# Patient Record
Sex: FEMALE | Race: WHITE | NOT HISPANIC OR LATINO | Employment: FULL TIME | URBAN - METROPOLITAN AREA
[De-identification: names, ages, dates, MRNs, and addresses within clinical notes are randomized per-mention and may not be internally consistent; named-entity substitution may affect disease eponyms.]

---

## 2019-02-01 ENCOUNTER — HOSPITAL ENCOUNTER (OUTPATIENT)
Dept: FAMILY MEDICINE CLINIC | Facility: CLINIC | Age: 64
Setting detail: SPECIMEN
Discharge: HOME | End: 2019-02-01
Attending: INTERNAL MEDICINE | Admitting: INTERNAL MEDICINE

## 2019-02-01 LAB
ALBUMIN SERPL-MCNC: 3.9 G/DL (ref 3.5–4.8)
ALBUMIN/GLOB SERPL: 1.7 {RATIO} (ref 1–1.7)
ALP SERPL-CCNC: 66 IU/L (ref 32–91)
ALT SERPL-CCNC: 14 IU/L (ref 14–54)
ANION GAP SERPL CALC-SCNC: 15.4 MMOL/L (ref 10–20)
AST SERPL-CCNC: 18 IU/L (ref 15–41)
BASOPHILS # BLD AUTO: 0.1 10*3/UL (ref 0–0.2)
BASOPHILS NFR BLD AUTO: 2 % (ref 0–2)
BILIRUB SERPL-MCNC: 0.8 MG/DL (ref 0.3–1.2)
BUN SERPL-MCNC: 14 MG/DL (ref 8–20)
BUN/CREAT SERPL: 17.5 (ref 5.4–26.2)
CALCIUM SERPL-MCNC: 9.1 MG/DL (ref 8.9–10.3)
CHLORIDE SERPL-SCNC: 100 MMOL/L (ref 101–111)
CHOLEST SERPL-MCNC: 214 MG/DL
CHOLEST/HDLC SERPL: 4.1 {RATIO}
CONV CO2: 25 MMOL/L (ref 22–32)
CONV LDL CHOLESTEROL DIRECT: 149 MG/DL (ref 0–100)
CONV TOTAL PROTEIN: 6.2 G/DL (ref 6.1–7.9)
CREAT UR-MCNC: 0.8 MG/DL (ref 0.4–1)
DIFFERENTIAL METHOD BLD: (no result)
EOSINOPHIL # BLD AUTO: 0.1 10*3/UL (ref 0–0.3)
EOSINOPHIL # BLD AUTO: 2 % (ref 0–3)
ERYTHROCYTE [DISTWIDTH] IN BLOOD BY AUTOMATED COUNT: 14.2 % (ref 11.5–14.5)
GLOBULIN UR ELPH-MCNC: 2.3 G/DL (ref 2.5–3.8)
GLUCOSE SERPL-MCNC: 133 MG/DL (ref 65–99)
HCT VFR BLD AUTO: 42.6 % (ref 35–49)
HDLC SERPL-MCNC: 52 MG/DL
HGB BLD-MCNC: 14 G/DL (ref 12–15)
LDLC/HDLC SERPL: 2.9 {RATIO}
LIPID INTERPRETATION: ABNORMAL
LYMPHOCYTES # BLD AUTO: 1.4 10*3/UL (ref 0.8–4.8)
LYMPHOCYTES NFR BLD AUTO: 30 % (ref 18–42)
MCH RBC QN AUTO: 29.2 PG (ref 26–32)
MCHC RBC AUTO-ENTMCNC: 32.9 G/DL (ref 32–36)
MCV RBC AUTO: 88.7 FL (ref 80–94)
MONOCYTES # BLD AUTO: 0.4 10*3/UL (ref 0.1–1.3)
MONOCYTES NFR BLD AUTO: 9 % (ref 2–11)
NEUTROPHILS # BLD AUTO: 2.6 10*3/UL (ref 2.3–8.6)
NEUTROPHILS NFR BLD AUTO: 57 % (ref 50–75)
NRBC BLD AUTO-RTO: 0 /100{WBCS}
NRBC/RBC NFR BLD MANUAL: 0 10*3/UL
PLATELET # BLD AUTO: 230 10*3/UL (ref 150–450)
PMV BLD AUTO: 9 FL (ref 7.4–10.4)
POTASSIUM SERPL-SCNC: 4.4 MMOL/L (ref 3.6–5.1)
RBC # BLD AUTO: 4.8 10*6/UL (ref 4–5.4)
SODIUM SERPL-SCNC: 136 MMOL/L (ref 136–144)
TRIGL SERPL-MCNC: 112 MG/DL
VLDLC SERPL CALC-MCNC: 12.9 MG/DL
WBC # BLD AUTO: 4.6 10*3/UL (ref 4.5–11.5)

## 2019-05-31 ENCOUNTER — HOSPITAL ENCOUNTER (OUTPATIENT)
Dept: FAMILY MEDICINE CLINIC | Facility: CLINIC | Age: 64
Setting detail: SPECIMEN
Discharge: HOME | End: 2019-05-31
Attending: INTERNAL MEDICINE | Admitting: INTERNAL MEDICINE

## 2019-05-31 LAB
25(OH)D3 SERPL-MCNC: 21 NG/ML (ref 30–100)
ALBUMIN SERPL-MCNC: 3.8 G/DL (ref 3.5–4.8)
ALBUMIN/GLOB SERPL: 1.7 {RATIO} (ref 1–1.7)
ALP SERPL-CCNC: 53 IU/L (ref 32–91)
ALT SERPL-CCNC: 13 IU/L (ref 14–54)
ANION GAP SERPL CALC-SCNC: 18.2 MMOL/L (ref 10–20)
AST SERPL-CCNC: 17 IU/L (ref 15–41)
BILIRUB SERPL-MCNC: 0.8 MG/DL (ref 0.3–1.2)
BUN SERPL-MCNC: 14 MG/DL (ref 8–20)
BUN/CREAT SERPL: 17.5 (ref 5.4–26.2)
CALCIUM SERPL-MCNC: 9.1 MG/DL (ref 8.9–10.3)
CHLORIDE SERPL-SCNC: 104 MMOL/L (ref 101–111)
CHOLEST SERPL-MCNC: 201 MG/DL
CHOLEST/HDLC SERPL: 4.2 {RATIO}
CONV CO2: 23 MMOL/L (ref 22–32)
CONV LDL CHOLESTEROL DIRECT: 139 MG/DL (ref 0–100)
CONV TOTAL PROTEIN: 6.1 G/DL (ref 6.1–7.9)
CREAT UR-MCNC: 0.8 MG/DL (ref 0.4–1)
GLOBULIN UR ELPH-MCNC: 2.3 G/DL (ref 2.5–3.8)
GLUCOSE SERPL-MCNC: 132 MG/DL (ref 65–99)
HBA1C MFR BLD: 6 % (ref 0–5.6)
HDLC SERPL-MCNC: 47 MG/DL
LDLC/HDLC SERPL: 2.9 {RATIO}
LIPID INTERPRETATION: ABNORMAL
POTASSIUM SERPL-SCNC: 4.2 MMOL/L (ref 3.6–5.1)
SODIUM SERPL-SCNC: 141 MMOL/L (ref 136–144)
TRIGL SERPL-MCNC: 131 MG/DL
VLDLC SERPL CALC-MCNC: 14.6 MG/DL

## 2019-08-16 RX ORDER — LISINOPRIL 2.5 MG/1
TABLET ORAL
Qty: 90 TABLET | Refills: 2 | Status: SHIPPED | OUTPATIENT
Start: 2019-08-16 | End: 2020-05-18

## 2019-11-18 RX ORDER — METFORMIN HYDROCHLORIDE 500 MG/1
TABLET, EXTENDED RELEASE ORAL
Qty: 90 TABLET | Refills: 1 | Status: SHIPPED | OUTPATIENT
Start: 2019-11-18 | End: 2020-07-15

## 2019-11-18 RX ORDER — ESCITALOPRAM OXALATE 20 MG/1
TABLET ORAL
Qty: 90 TABLET | Refills: 1 | Status: SHIPPED | OUTPATIENT
Start: 2019-11-18 | End: 2020-10-22

## 2019-12-19 ENCOUNTER — TELEPHONE (OUTPATIENT)
Dept: FAMILY MEDICINE CLINIC | Facility: CLINIC | Age: 64
End: 2019-12-19

## 2019-12-19 DIAGNOSIS — Z12.39 SCREENING FOR BREAST CANCER: Primary | ICD-10-CM

## 2019-12-19 NOTE — TELEPHONE ENCOUNTER
Cherelle, Can you please let patient know that I am faxing it over in the 5 mins and give them about 30 to 45 mins to get it in their system and then she should be able to call and schedule.      Thanks, Bambi

## 2019-12-19 NOTE — TELEPHONE ENCOUNTER
Needs an order for a mammogram faxed to [facility] at fax [phone].

## 2019-12-24 ENCOUNTER — LAB (OUTPATIENT)
Dept: FAMILY MEDICINE CLINIC | Facility: CLINIC | Age: 64
End: 2019-12-24

## 2019-12-24 ENCOUNTER — OFFICE VISIT (OUTPATIENT)
Dept: FAMILY MEDICINE CLINIC | Facility: CLINIC | Age: 64
End: 2019-12-24

## 2019-12-24 VITALS
TEMPERATURE: 98.2 F | WEIGHT: 239.4 LBS | RESPIRATION RATE: 12 BRPM | OXYGEN SATURATION: 97 % | HEIGHT: 68 IN | HEART RATE: 53 BPM | BODY MASS INDEX: 36.28 KG/M2 | DIASTOLIC BLOOD PRESSURE: 70 MMHG | SYSTOLIC BLOOD PRESSURE: 122 MMHG

## 2019-12-24 DIAGNOSIS — I10 ESSENTIAL HYPERTENSION: ICD-10-CM

## 2019-12-24 DIAGNOSIS — F41.9 ANXIETY: ICD-10-CM

## 2019-12-24 DIAGNOSIS — E11.65 TYPE 2 DIABETES MELLITUS WITH HYPERGLYCEMIA, WITHOUT LONG-TERM CURRENT USE OF INSULIN (HCC): ICD-10-CM

## 2019-12-24 DIAGNOSIS — E78.5 HYPERLIPIDEMIA, UNSPECIFIED HYPERLIPIDEMIA TYPE: ICD-10-CM

## 2019-12-24 DIAGNOSIS — E11.65 TYPE 2 DIABETES MELLITUS WITH HYPERGLYCEMIA, WITHOUT LONG-TERM CURRENT USE OF INSULIN (HCC): Primary | ICD-10-CM

## 2019-12-24 LAB
ALBUMIN SERPL-MCNC: 4.1 G/DL (ref 3.5–5.2)
ALBUMIN/GLOB SERPL: 1.4 G/DL
ALP SERPL-CCNC: 69 U/L (ref 39–117)
ALT SERPL W P-5'-P-CCNC: 12 U/L (ref 1–33)
ANION GAP SERPL CALCULATED.3IONS-SCNC: 12.2 MMOL/L (ref 5–15)
AST SERPL-CCNC: 14 U/L (ref 1–32)
BASOPHILS # BLD AUTO: 0.04 10*3/MM3 (ref 0–0.2)
BASOPHILS NFR BLD AUTO: 0.8 % (ref 0–1.5)
BILIRUB SERPL-MCNC: 0.5 MG/DL (ref 0.2–1.2)
BUN BLD-MCNC: 15 MG/DL (ref 8–23)
BUN/CREAT SERPL: 20.8 (ref 7–25)
CALCIUM SPEC-SCNC: 9.6 MG/DL (ref 8.6–10.5)
CHLORIDE SERPL-SCNC: 102 MMOL/L (ref 98–107)
CHOLEST SERPL-MCNC: 213 MG/DL (ref 0–200)
CO2 SERPL-SCNC: 25.8 MMOL/L (ref 22–29)
CREAT BLD-MCNC: 0.72 MG/DL (ref 0.57–1)
DEPRECATED RDW RBC AUTO: 42 FL (ref 37–54)
EOSINOPHIL # BLD AUTO: 0.08 10*3/MM3 (ref 0–0.4)
EOSINOPHIL NFR BLD AUTO: 1.6 % (ref 0.3–6.2)
ERYTHROCYTE [DISTWIDTH] IN BLOOD BY AUTOMATED COUNT: 13.1 % (ref 12.3–15.4)
GFR SERPL CREATININE-BSD FRML MDRD: 82 ML/MIN/1.73
GLOBULIN UR ELPH-MCNC: 2.9 GM/DL
GLUCOSE BLD-MCNC: 126 MG/DL (ref 65–99)
HBA1C MFR BLD: 6.2 % (ref 3.5–5.6)
HCT VFR BLD AUTO: 40.3 % (ref 34–46.6)
HDLC SERPL-MCNC: 60 MG/DL (ref 40–60)
HGB BLD-MCNC: 13.5 G/DL (ref 12–15.9)
IMM GRANULOCYTES # BLD AUTO: 0.01 10*3/MM3 (ref 0–0.05)
IMM GRANULOCYTES NFR BLD AUTO: 0.2 % (ref 0–0.5)
LDLC SERPL CALC-MCNC: 132 MG/DL (ref 0–100)
LDLC/HDLC SERPL: 2.2 {RATIO}
LYMPHOCYTES # BLD AUTO: 1.75 10*3/MM3 (ref 0.7–3.1)
LYMPHOCYTES NFR BLD AUTO: 34.7 % (ref 19.6–45.3)
MCH RBC QN AUTO: 29.4 PG (ref 26.6–33)
MCHC RBC AUTO-ENTMCNC: 33.5 G/DL (ref 31.5–35.7)
MCV RBC AUTO: 87.8 FL (ref 79–97)
MONOCYTES # BLD AUTO: 0.41 10*3/MM3 (ref 0.1–0.9)
MONOCYTES NFR BLD AUTO: 8.1 % (ref 5–12)
NEUTROPHILS # BLD AUTO: 2.75 10*3/MM3 (ref 1.7–7)
NEUTROPHILS NFR BLD AUTO: 54.6 % (ref 42.7–76)
NRBC BLD AUTO-RTO: 0 /100 WBC (ref 0–0.2)
PLATELET # BLD AUTO: 222 10*3/MM3 (ref 140–450)
PMV BLD AUTO: 11.7 FL (ref 6–12)
POTASSIUM BLD-SCNC: 4.7 MMOL/L (ref 3.5–5.2)
PROT SERPL-MCNC: 7 G/DL (ref 6–8.5)
RBC # BLD AUTO: 4.59 10*6/MM3 (ref 3.77–5.28)
SODIUM BLD-SCNC: 140 MMOL/L (ref 136–145)
TRIGL SERPL-MCNC: 105 MG/DL (ref 0–150)
TSH SERPL DL<=0.05 MIU/L-ACNC: 2.24 UIU/ML (ref 0.27–4.2)
VLDLC SERPL-MCNC: 21 MG/DL (ref 5–40)
WBC NRBC COR # BLD: 5.04 10*3/MM3 (ref 3.4–10.8)

## 2019-12-24 PROCEDURE — 80053 COMPREHEN METABOLIC PANEL: CPT | Performed by: INTERNAL MEDICINE

## 2019-12-24 PROCEDURE — 83036 HEMOGLOBIN GLYCOSYLATED A1C: CPT | Performed by: INTERNAL MEDICINE

## 2019-12-24 PROCEDURE — 84443 ASSAY THYROID STIM HORMONE: CPT | Performed by: INTERNAL MEDICINE

## 2019-12-24 PROCEDURE — 99214 OFFICE O/P EST MOD 30 MIN: CPT | Performed by: INTERNAL MEDICINE

## 2019-12-24 PROCEDURE — 36415 COLL VENOUS BLD VENIPUNCTURE: CPT

## 2019-12-24 PROCEDURE — 85025 COMPLETE CBC W/AUTO DIFF WBC: CPT | Performed by: INTERNAL MEDICINE

## 2019-12-24 PROCEDURE — 80061 LIPID PANEL: CPT | Performed by: INTERNAL MEDICINE

## 2019-12-24 RX ORDER — DIAZEPAM 2 MG/1
2-4 TABLET ORAL DAILY PRN
Qty: 20 TABLET | Refills: 1 | Status: SHIPPED | OUTPATIENT
Start: 2019-12-24 | End: 2020-04-16

## 2019-12-24 RX ORDER — SODIUM FLUORIDE 5 MG/ML
PASTE, DENTIFRICE DENTAL
COMMUNITY
Start: 2018-12-10 | End: 2020-11-11

## 2019-12-24 RX ORDER — PRAVASTATIN SODIUM 40 MG
40 TABLET ORAL DAILY
COMMUNITY
Start: 2018-10-23 | End: 2020-01-29 | Stop reason: SDUPTHER

## 2019-12-24 RX ORDER — METFORMIN HYDROCHLORIDE 500 MG/1
TABLET, EXTENDED RELEASE ORAL
COMMUNITY
Start: 2019-11-18 | End: 2020-11-11

## 2019-12-24 RX ORDER — LISINOPRIL 2.5 MG/1
TABLET ORAL
COMMUNITY
Start: 2019-11-19 | End: 2020-11-11

## 2019-12-24 RX ORDER — ESCITALOPRAM OXALATE 20 MG/1
TABLET ORAL
COMMUNITY
Start: 2019-11-18 | End: 2020-11-11

## 2020-01-10 DIAGNOSIS — Z12.31 ENCOUNTER FOR SCREENING MAMMOGRAM FOR BREAST CANCER: Primary | ICD-10-CM

## 2020-01-13 DIAGNOSIS — Z12.31 ENCOUNTER FOR SCREENING MAMMOGRAM FOR BREAST CANCER: ICD-10-CM

## 2020-01-23 ENCOUNTER — TELEPHONE (OUTPATIENT)
Dept: FAMILY MEDICINE CLINIC | Facility: CLINIC | Age: 65
End: 2020-01-23

## 2020-01-23 RX ORDER — FLUCONAZOLE 150 MG/1
TABLET ORAL
Qty: 1 TABLET | Refills: 0 | Status: SHIPPED | OUTPATIENT
Start: 2020-01-23 | End: 2020-11-11

## 2020-01-29 RX ORDER — PRAVASTATIN SODIUM 40 MG
40 TABLET ORAL DAILY
Qty: 90 TABLET | Refills: 0 | Status: SHIPPED | OUTPATIENT
Start: 2020-01-29 | End: 2020-04-21

## 2020-04-15 DIAGNOSIS — F41.9 ANXIETY: ICD-10-CM

## 2020-04-16 RX ORDER — DIAZEPAM 2 MG/1
2-4 TABLET ORAL DAILY PRN
Qty: 20 TABLET | Refills: 1 | Status: SHIPPED | OUTPATIENT
Start: 2020-04-16 | End: 2020-11-11 | Stop reason: SDUPTHER

## 2020-04-21 RX ORDER — PRAVASTATIN SODIUM 40 MG
TABLET ORAL
Qty: 90 TABLET | Refills: 0 | Status: SHIPPED | OUTPATIENT
Start: 2020-04-21 | End: 2020-10-16

## 2020-05-18 RX ORDER — LISINOPRIL 2.5 MG/1
TABLET ORAL
Qty: 90 TABLET | Refills: 2 | Status: SHIPPED | OUTPATIENT
Start: 2020-05-18 | End: 2021-06-04 | Stop reason: SDUPTHER

## 2020-07-15 RX ORDER — METFORMIN HYDROCHLORIDE 500 MG/1
TABLET, EXTENDED RELEASE ORAL
Qty: 90 TABLET | Refills: 1 | Status: SHIPPED | OUTPATIENT
Start: 2020-07-15 | End: 2021-01-11

## 2020-10-16 RX ORDER — PRAVASTATIN SODIUM 40 MG
TABLET ORAL
Qty: 90 TABLET | Refills: 0 | Status: SHIPPED | OUTPATIENT
Start: 2020-10-16 | End: 2021-01-11

## 2020-10-22 RX ORDER — ESCITALOPRAM OXALATE 20 MG/1
TABLET ORAL
Qty: 90 TABLET | Refills: 1 | Status: SHIPPED | OUTPATIENT
Start: 2020-10-22 | End: 2021-05-25

## 2020-11-11 ENCOUNTER — OFFICE VISIT (OUTPATIENT)
Dept: FAMILY MEDICINE CLINIC | Facility: CLINIC | Age: 65
End: 2020-11-11

## 2020-11-11 ENCOUNTER — LAB (OUTPATIENT)
Dept: FAMILY MEDICINE CLINIC | Facility: CLINIC | Age: 65
End: 2020-11-11

## 2020-11-11 VITALS
SYSTOLIC BLOOD PRESSURE: 126 MMHG | BODY MASS INDEX: 38.13 KG/M2 | RESPIRATION RATE: 16 BRPM | HEART RATE: 53 BPM | TEMPERATURE: 97.3 F | OXYGEN SATURATION: 97 % | HEIGHT: 68 IN | WEIGHT: 251.6 LBS | DIASTOLIC BLOOD PRESSURE: 72 MMHG

## 2020-11-11 DIAGNOSIS — E78.5 HYPERLIPIDEMIA, UNSPECIFIED HYPERLIPIDEMIA TYPE: ICD-10-CM

## 2020-11-11 DIAGNOSIS — E11.65 TYPE 2 DIABETES MELLITUS WITH HYPERGLYCEMIA, WITHOUT LONG-TERM CURRENT USE OF INSULIN (HCC): ICD-10-CM

## 2020-11-11 DIAGNOSIS — I10 ESSENTIAL HYPERTENSION: ICD-10-CM

## 2020-11-11 DIAGNOSIS — E55.9 VITAMIN D DEFICIENCY: ICD-10-CM

## 2020-11-11 DIAGNOSIS — F41.9 ANXIETY: ICD-10-CM

## 2020-11-11 DIAGNOSIS — I10 ESSENTIAL HYPERTENSION: Primary | ICD-10-CM

## 2020-11-11 LAB
25(OH)D3 SERPL-MCNC: 50.9 NG/ML (ref 30–100)
ALBUMIN SERPL-MCNC: 4.3 G/DL (ref 3.5–5.2)
ALBUMIN UR-MCNC: <1.2 MG/DL
ALBUMIN/GLOB SERPL: 1.9 G/DL
ALP SERPL-CCNC: 65 U/L (ref 39–117)
ALT SERPL W P-5'-P-CCNC: 17 U/L (ref 1–33)
ANION GAP SERPL CALCULATED.3IONS-SCNC: 6.6 MMOL/L (ref 5–15)
AST SERPL-CCNC: 21 U/L (ref 1–32)
BACTERIA UR QL AUTO: NORMAL /HPF
BASOPHILS # BLD AUTO: 0.03 10*3/MM3 (ref 0–0.2)
BASOPHILS NFR BLD AUTO: 0.7 % (ref 0–1.5)
BILIRUB SERPL-MCNC: 0.5 MG/DL (ref 0–1.2)
BILIRUB UR QL STRIP: NEGATIVE
BUN SERPL-MCNC: 11 MG/DL (ref 8–23)
BUN/CREAT SERPL: 14.7 (ref 7–25)
CALCIUM SPEC-SCNC: 8.9 MG/DL (ref 8.6–10.5)
CHLORIDE SERPL-SCNC: 104 MMOL/L (ref 98–107)
CHOLEST SERPL-MCNC: 199 MG/DL (ref 0–200)
CLARITY UR: CLEAR
CO2 SERPL-SCNC: 28.4 MMOL/L (ref 22–29)
COLOR UR: YELLOW
CREAT SERPL-MCNC: 0.75 MG/DL (ref 0.57–1)
CREAT UR-MCNC: 88.1 MG/DL
DEPRECATED RDW RBC AUTO: 42.9 FL (ref 37–54)
EOSINOPHIL # BLD AUTO: 0.08 10*3/MM3 (ref 0–0.4)
EOSINOPHIL NFR BLD AUTO: 1.8 % (ref 0.3–6.2)
ERYTHROCYTE [DISTWIDTH] IN BLOOD BY AUTOMATED COUNT: 13.1 % (ref 12.3–15.4)
GFR SERPL CREATININE-BSD FRML MDRD: 78 ML/MIN/1.73
GLOBULIN UR ELPH-MCNC: 2.3 GM/DL
GLUCOSE SERPL-MCNC: 112 MG/DL (ref 65–99)
GLUCOSE UR STRIP-MCNC: NEGATIVE MG/DL
HBA1C MFR BLD: 6.7 % (ref 3.5–5.6)
HCT VFR BLD AUTO: 38.9 % (ref 34–46.6)
HDLC SERPL-MCNC: 55 MG/DL (ref 40–60)
HGB BLD-MCNC: 13.3 G/DL (ref 12–15.9)
HGB UR QL STRIP.AUTO: NEGATIVE
HYALINE CASTS UR QL AUTO: NORMAL /LPF
IMM GRANULOCYTES # BLD AUTO: 0.01 10*3/MM3 (ref 0–0.05)
IMM GRANULOCYTES NFR BLD AUTO: 0.2 % (ref 0–0.5)
KETONES UR QL STRIP: NEGATIVE
LDLC SERPL CALC-MCNC: 124 MG/DL (ref 0–100)
LDLC/HDLC SERPL: 2.21 {RATIO}
LEUKOCYTE ESTERASE UR QL STRIP.AUTO: ABNORMAL
LYMPHOCYTES # BLD AUTO: 1.58 10*3/MM3 (ref 0.7–3.1)
LYMPHOCYTES NFR BLD AUTO: 35.2 % (ref 19.6–45.3)
MCH RBC QN AUTO: 30.6 PG (ref 26.6–33)
MCHC RBC AUTO-ENTMCNC: 34.2 G/DL (ref 31.5–35.7)
MCV RBC AUTO: 89.4 FL (ref 79–97)
MICROALBUMIN/CREAT UR: NORMAL MG/G{CREAT}
MONOCYTES # BLD AUTO: 0.4 10*3/MM3 (ref 0.1–0.9)
MONOCYTES NFR BLD AUTO: 8.9 % (ref 5–12)
NEUTROPHILS NFR BLD AUTO: 2.39 10*3/MM3 (ref 1.7–7)
NEUTROPHILS NFR BLD AUTO: 53.2 % (ref 42.7–76)
NITRITE UR QL STRIP: NEGATIVE
NRBC BLD AUTO-RTO: 0 /100 WBC (ref 0–0.2)
PH UR STRIP.AUTO: 5.5 [PH] (ref 5–8)
PLATELET # BLD AUTO: 205 10*3/MM3 (ref 140–450)
PMV BLD AUTO: 11 FL (ref 6–12)
POTASSIUM SERPL-SCNC: 4.4 MMOL/L (ref 3.5–5.2)
PROT SERPL-MCNC: 6.6 G/DL (ref 6–8.5)
PROT UR QL STRIP: NEGATIVE
RBC # BLD AUTO: 4.35 10*6/MM3 (ref 3.77–5.28)
RBC # UR: NORMAL /HPF
REF LAB TEST METHOD: NORMAL
SODIUM SERPL-SCNC: 139 MMOL/L (ref 136–145)
SP GR UR STRIP: 1.02 (ref 1–1.03)
SQUAMOUS #/AREA URNS HPF: NORMAL /HPF
TRIGL SERPL-MCNC: 112 MG/DL (ref 0–150)
TSH SERPL DL<=0.05 MIU/L-ACNC: 2.51 UIU/ML (ref 0.27–4.2)
UROBILINOGEN UR QL STRIP: ABNORMAL
VLDLC SERPL-MCNC: 20 MG/DL (ref 5–40)
WBC # BLD AUTO: 4.49 10*3/MM3 (ref 3.4–10.8)
WBC UR QL AUTO: NORMAL /HPF

## 2020-11-11 PROCEDURE — 82043 UR ALBUMIN QUANTITATIVE: CPT | Performed by: INTERNAL MEDICINE

## 2020-11-11 PROCEDURE — 80053 COMPREHEN METABOLIC PANEL: CPT | Performed by: INTERNAL MEDICINE

## 2020-11-11 PROCEDURE — 99214 OFFICE O/P EST MOD 30 MIN: CPT | Performed by: INTERNAL MEDICINE

## 2020-11-11 PROCEDURE — 83036 HEMOGLOBIN GLYCOSYLATED A1C: CPT | Performed by: INTERNAL MEDICINE

## 2020-11-11 PROCEDURE — 84443 ASSAY THYROID STIM HORMONE: CPT | Performed by: INTERNAL MEDICINE

## 2020-11-11 PROCEDURE — 85025 COMPLETE CBC W/AUTO DIFF WBC: CPT | Performed by: INTERNAL MEDICINE

## 2020-11-11 PROCEDURE — 36415 COLL VENOUS BLD VENIPUNCTURE: CPT

## 2020-11-11 PROCEDURE — 82570 ASSAY OF URINE CREATININE: CPT | Performed by: INTERNAL MEDICINE

## 2020-11-11 PROCEDURE — 82306 VITAMIN D 25 HYDROXY: CPT | Performed by: INTERNAL MEDICINE

## 2020-11-11 PROCEDURE — 81001 URINALYSIS AUTO W/SCOPE: CPT

## 2020-11-11 PROCEDURE — 80061 LIPID PANEL: CPT | Performed by: INTERNAL MEDICINE

## 2020-11-11 RX ORDER — DIAZEPAM 2 MG/1
2-4 TABLET ORAL DAILY PRN
Qty: 20 TABLET | Refills: 1 | Status: SHIPPED | OUTPATIENT
Start: 2020-11-11 | End: 2021-06-04 | Stop reason: SDUPTHER

## 2020-11-11 NOTE — PROGRESS NOTES
Subjective   Selene Holley is a 65 y.o. female.     Pt is here for med check dm, hpld, mood d/o  She is due for labs  Her  is now [place] in the [place], and sober  And she has realized that he had been using alcohol  As a way to self-treat his mood d/o  bc of covid, she hasn't been eating as well or exercising    No chest pain, SOA  No abd pain, n/v/d  No fever, cough  No dizziness  No headache       The following portions of the patient's history were reviewed and updated as appropriate: allergies, current medications, past family history, past medical history, past social history, past surgical history and problem list.    Review of Systems   Constitutional: Negative for fatigue and fever.   HENT: Negative for congestion, ear pain, rhinorrhea and sore throat.    Eyes: Negative for blurred vision and itching.   Respiratory: Negative for cough and shortness of breath.    Cardiovascular: Negative for chest pain and palpitations.   Gastrointestinal: Negative for abdominal pain, diarrhea and vomiting.   Endocrine: Negative for polydipsia and polyuria.   Genitourinary: Negative for dysuria, frequency, hematuria and urgency.   Musculoskeletal: Negative for joint swelling and myalgias.   Skin: Negative for rash and skin lesions.   Neurological: Negative for dizziness, numbness and headache.   Psychiatric/Behavioral: Positive for stress. Negative for depressed mood. The patient is not nervous/anxious.          Current Outpatient Medications:   •  diazePAM (VALIUM) 2 MG tablet, TAKE 1-2 TABLETS BY MOUTH DAILY AS NEEDED FOR ANXIETY., Disp: 20 tablet, Rfl: 1  •  escitalopram (LEXAPRO) 20 MG tablet, TAKE 1 TABLET BY MOUTH EVERY DAY, Disp: 90 tablet, Rfl: 1  •  lisinopril (PRINIVIL,ZESTRIL) 2.5 MG tablet, TAKE 1 TABLET BY MOUTH EVERY DAY, Disp: 90 tablet, Rfl: 2  •  metFORMIN ER (GLUCOPHAGE-XR) 500 MG 24 hr tablet, TAKE 1 TABLET BY MOUTH EVERY DAY, Disp: 90 tablet, Rfl: 1  •  pravastatin (PRAVACHOL) 40 MG tablet, TAKE 1 TABLET  "BY MOUTH EVERY DAY, Disp: 90 tablet, Rfl: 0    Objective   /72 (BP Location: Left arm, Patient Position: Sitting, Cuff Size: Adult)   Pulse 53   Temp 97.3 °F (36.3 °C) (Temporal)   Resp 16   Ht 172.7 cm (68\")   Wt 114 kg (251 lb 9.6 oz)   SpO2 97%   Breastfeeding No   BMI 38.26 kg/m²   Physical Exam  Vitals signs reviewed.   Constitutional:       General: She is not in acute distress.     Appearance: She is well-developed. She is not diaphoretic.   HENT:      Head: Normocephalic and atraumatic.      Right Ear: Tympanic membrane, ear canal and external ear normal.      Left Ear: Tympanic membrane, ear canal and external ear normal.      Mouth/Throat:      Pharynx: No oropharyngeal exudate.   Eyes:      General: No scleral icterus.        Right eye: No discharge.         Left eye: No discharge.      Conjunctiva/sclera: Conjunctivae normal.   Neck:      Musculoskeletal: Normal range of motion and neck supple.      Thyroid: No thyromegaly.   Cardiovascular:      Rate and Rhythm: Normal rate and regular rhythm.      Heart sounds: Normal heart sounds. No murmur. No friction rub. No gallop.    Pulmonary:      Effort: Pulmonary effort is normal. No respiratory distress.      Breath sounds: Normal breath sounds. No wheezing or rales.   Musculoskeletal: Normal range of motion.         General: No deformity.   Lymphadenopathy:      Cervical: No cervical adenopathy.   Skin:     General: Skin is warm and dry.      Findings: No erythema or rash.   Neurological:      Mental Status: She is alert and oriented to person, place, and time.      Cranial Nerves: No cranial nerve deficit.   Psychiatric:         Behavior: Behavior normal.         Thought Content: Thought content normal.           Assessment/Plan   Problems Addressed this Visit     None      Visit Diagnoses     Essential hypertension    -  Primary    Relevant Orders    CBC Auto Differential (Completed)    Comprehensive Metabolic Panel (Completed)    Lipid " Panel (Completed)    TSH (Completed)    Hyperlipidemia, unspecified hyperlipidemia type        Relevant Orders    CBC Auto Differential (Completed)    Comprehensive Metabolic Panel (Completed)    Lipid Panel (Completed)    TSH (Completed)    Type 2 diabetes mellitus with hyperglycemia, without long-term current use of insulin (CMS/[facility])        Relevant Orders    Hemoglobin A1c (Completed)    Urinalysis With Culture If Indicated - (Completed)    Microalbumin / Creatinine Urine Ratio - Urine, Clean Catch (Completed)    Anxiety        Relevant Medications    diazePAM (VALIUM) 2 MG tablet    Vitamin D deficiency        Relevant Orders    Vitamin D 25 hydroxy (Completed)      Diagnoses       Codes Comments    Essential hypertension    -  Primary ICD-10-CM: I10  ICD-9-CM: 401.9     Hyperlipidemia, unspecified hyperlipidemia type     ICD-10-CM: E78.5  ICD-9-CM: 272.4     Type 2 diabetes mellitus with hyperglycemia, without long-term current use of insulin (CMS/[facility])     ICD-10-CM: E11.65  ICD-9-CM: 250.00, 790.29     Anxiety     ICD-10-CM: F41.9  ICD-9-CM: 300.00     Vitamin D deficiency     ICD-10-CM: E55.9  ICD-9-CM: 268.9         Check labs  Continue current meds  Refill valium prn - uses it primarily if she can't sleep  For now, she thinks she's doing ok on lexapro  She is going to try to get an exercise machine to put in her house  Encouraged her to continue working on diet/exercise         Procedures

## 2020-12-28 ENCOUNTER — OFFICE VISIT (OUTPATIENT)
Dept: FAMILY MEDICINE CLINIC | Facility: CLINIC | Age: 65
End: 2020-12-28

## 2020-12-28 VITALS
SYSTOLIC BLOOD PRESSURE: 130 MMHG | OXYGEN SATURATION: 97 % | BODY MASS INDEX: 38.49 KG/M2 | HEIGHT: 68 IN | TEMPERATURE: 96.6 F | WEIGHT: 254 LBS | HEART RATE: 66 BPM | DIASTOLIC BLOOD PRESSURE: 80 MMHG

## 2020-12-28 DIAGNOSIS — Z79.899 HIGH RISK MEDICATION USE: ICD-10-CM

## 2020-12-28 DIAGNOSIS — E11.65 CONTROLLED TYPE 2 DIABETES MELLITUS WITH HYPERGLYCEMIA, WITHOUT LONG-TERM CURRENT USE OF INSULIN (HCC): Primary | ICD-10-CM

## 2020-12-28 DIAGNOSIS — E66.9 OBESITY (BMI 30-39.9): ICD-10-CM

## 2020-12-28 DIAGNOSIS — F41.8 MIXED ANXIETY DEPRESSIVE DISORDER: ICD-10-CM

## 2020-12-28 DIAGNOSIS — E78.5 HYPERLIPIDEMIA, UNSPECIFIED HYPERLIPIDEMIA TYPE: ICD-10-CM

## 2020-12-28 PROCEDURE — 99214 OFFICE O/P EST MOD 30 MIN: CPT | Performed by: FAMILY MEDICINE

## 2020-12-28 NOTE — PROGRESS NOTES
HPI  Selene Holley is a 65 y.o. female who is here to establish a new primary care physician.  Previous [facility] physician apparently left her practice.  Overall patient reports doing well on current regimen which includes medication for anxiety depressive disorder.  Also diabetes with most recent A1c indicating good control.  Patient does monitor her blood sugars.  Some weight gain noted and encouraged diabetic diet and other issues.  Recent lab work reviewed.  Cholesterol level is slightly above goal and will recheck in May as discussed.      Review of Systems   Respiratory: Negative for shortness of breath.    Cardiovascular: Positive for leg swelling.   Genitourinary:        Reports history of total hysterectomy for fibroids.         Past Medical History:   Diagnosis Date   • Diabetic peripheral neuropathy (CMS/HCC)    • DM (diabetes mellitus), type 2 (CMS/HCC)    • Hyperlipidemia    • Matta's neuroma of both feet    • Pancreatitis     GALLBLADDER PANCREATITIS   • Peripheral edema    • Situational depression      PROLONGED   • Urethral granuloma        Past Surgical History:   Procedure Laterality Date   • CHOLECYSTECTOMY     • COLONOSCOPY  04/2018    CLEAR/Q10 YEARS   • CYST REMOVAL      LEFT THIGH;41 YEARS AGO   • MYOMECTOMY  02/2002   • TONSILLECTOMY AND ADENOIDECTOMY     • TOTAL ABDOMINAL HYSTERECTOMY  2000     2 UTERINE FIBROIDS   • TOTAL KNEE ARTHROPLASTY Right 2017       Family History   Problem Relation Age of Onset   • Polycystic kidney disease Mother    • Polycystic kidney disease Brother    • Diabetes Brother    • Polycystic kidney disease Brother    • Breast cancer Sister         LATE 50'S   • Diabetes Sister    • Diabetes Maternal Aunt    • Hyperlipidemia Daughter    • Other Daughter         FATTY LIVER        Social History     Socioeconomic History   • Marital status:      Spouse name: Not on file   • Number of children: Not on file   • Years of education: Not on file   • Highest education  level: Not on file   Tobacco Use   • Smoking status: Former Smoker   • Smokeless tobacco: Never Used   Substance and Sexual Activity   • Alcohol use: Yes   • Drug use: Never   • Sexual activity: Defer       Vitals:    12/28/20 0959   BP: 130/80   Pulse: 66   Temp: 96.6 °F (35.9 °C)   SpO2: 97%        Body mass index is 38.62 kg/m².      Physical Exam  Vitals signs and nursing note reviewed.   Constitutional:       General: She is not in acute distress.     Appearance: She is well-developed.   HENT:      Head: Normocephalic and atraumatic.   Eyes:      Conjunctiva/sclera: Conjunctivae normal.      Pupils: Pupils are equal, round, and reactive to light.   Neck:      Musculoskeletal: Normal range of motion.      Thyroid: No thyromegaly.   Cardiovascular:      Rate and Rhythm: Normal rate and regular rhythm.      Heart sounds: Normal heart sounds.   Pulmonary:      Effort: Pulmonary effort is normal. No respiratory distress.      Breath sounds: Normal breath sounds.   Abdominal:      General: There is no distension.      Palpations: Abdomen is soft. There is no mass.      Tenderness: There is no abdominal tenderness.      Hernia: No hernia is present.   Musculoskeletal: Normal range of motion.         General: No tenderness or deformity.   Lymphadenopathy:      Cervical: No cervical adenopathy.   Skin:     General: Skin is warm and dry.      Coloration: Skin is not pale.      Findings: No rash.   Neurological:      General: No focal deficit present.      Mental Status: She is alert and oriented to person, place, and time. Mental status is at baseline.      Motor: No abnormal muscle tone.      Coordination: Coordination normal.   Psychiatric:         Mood and Affect: Mood normal.         Behavior: Behavior normal.         Thought Content: Thought content normal.         Judgment: Judgment normal.           Assessment/Plan    Diagnoses and all orders for this visit:    1. Controlled type 2 diabetes mellitus with  hyperglycemia, without long-term current use of insulin (CMS/[facility]) (Primary)    2. Hyperlipidemia, unspecified hyperlipidemia type    3. Mixed anxiety depressive disorder    4. High risk medication use    5. Obesity (BMI 30-39.9)      Patient here to establish a new primary care physician.  Lab work reviewed from last month indicating good diabetic control.  Some weight gain noted and encouraged weight loss and healthy diet.  Patient does have history of Matta's neuroma and plans on getting new podiatrist.  We will plan on follow-up appointment in 5 months to recheck routine lab work and make further adjustments as needed.    Did spend extra time reviewing records from previous physician as well as records from previous hospitalizations for gallbladder removal and colonoscopy.    This note includes information entered using a voice recognition dictation system.  Though reviewed, some nonsensible errors may remain.

## 2021-01-06 ENCOUNTER — TELEPHONE (OUTPATIENT)
Dept: FAMILY MEDICINE CLINIC | Facility: CLINIC | Age: 66
End: 2021-01-06

## 2021-01-06 DIAGNOSIS — Z12.31 BREAST CANCER SCREENING BY MAMMOGRAM: Primary | ICD-10-CM

## 2021-01-06 NOTE — TELEPHONE ENCOUNTER
PATIENT IS NEEDING A MAMMOGRAM ORDER TO BE SENT TO [facility] AT [phone] HER APPT IS ON JAN 22.    PLEASE ADVISE  [phone]

## 2021-01-11 RX ORDER — METFORMIN HYDROCHLORIDE 500 MG/1
TABLET, EXTENDED RELEASE ORAL
Qty: 90 TABLET | Refills: 1 | Status: SHIPPED | OUTPATIENT
Start: 2021-01-11 | End: 2021-10-07

## 2021-01-11 RX ORDER — PRAVASTATIN SODIUM 40 MG
40 TABLET ORAL NIGHTLY
Qty: 90 TABLET | Refills: 3 | Status: SHIPPED | OUTPATIENT
Start: 2021-01-11 | End: 2022-04-11

## 2021-03-30 ENCOUNTER — TELEPHONE (OUTPATIENT)
Dept: FAMILY MEDICINE CLINIC | Facility: CLINIC | Age: 66
End: 2021-03-30

## 2021-03-30 RX ORDER — NYSTATIN 100000 U/G
CREAM TOPICAL 2 TIMES DAILY
Qty: 30 G | Refills: 1 | Status: SHIPPED | OUTPATIENT
Start: 2021-03-30 | End: 2022-10-10

## 2021-03-30 NOTE — TELEPHONE ENCOUNTER
Caller: Selene Holley    Relationship to patient: Self    Best call back number:[phone]    Patient is needing: PATIENT IS CALLING TO STATE SHE HAS A REDDISH RASH WITH BLISTERS ON HER ABDOMINAL AREA.  SHE STATES IS IT IN A CREASE,  SHE STATES IT NOT PAINFUL.  SHE IS WANTING TO KNOW  WHAT DR HERNANDES SUGGESTS .    PLEASE         [facility] [id number] - [place], [place] - [address] AT [facility] - [phone]  - [phone]   [phone]

## 2021-04-05 ENCOUNTER — TELEPHONE (OUTPATIENT)
Dept: FAMILY MEDICINE CLINIC | Facility: CLINIC | Age: 66
End: 2021-04-05

## 2021-04-05 DIAGNOSIS — G47.30 SLEEP APNEA IN ADULT: Primary | ICD-10-CM

## 2021-04-05 NOTE — TELEPHONE ENCOUNTER
Caller: Kishan Selene LUIS MIGUEL    Relationship: Self    Best call back number: [phone] (H)    What is the medical concern/diagnosis: NOT SLEEPING WELL AT NIGHT, ALSO TIRED.     What specialty or service is being requested:     What is the provider, practice or medical service name:     What is the office location:     What is the office phone number:     Any additional details: PATIENT CALLED AND WOULD LIKE TO BE REFERRED TO DO AN AT HOME SLEEP STUDY . PATIENT STATES THAT SHE THINKS SHE MAY HAVE SLEEP APNEA.    PLEASE CONTACT PATIENT TO ADVISE.       THANKS

## 2021-04-06 ENCOUNTER — TELEPHONE (OUTPATIENT)
Dept: FAMILY MEDICINE CLINIC | Facility: CLINIC | Age: 66
End: 2021-04-06

## 2021-04-06 NOTE — TELEPHONE ENCOUNTER
FOR THE HOME SLEEP STUDY THE OFFICE NOTE FROM 12/20/20 A NEW DIAG OF EXCEESIVE DAYTIME SLEEPINESS WILL HAVE TO BE PUT IN.    LET ME KNOW AND I WILL RESEND OFFICE NOTE TO FAX#[phone]

## 2021-04-09 ENCOUNTER — OFFICE VISIT (OUTPATIENT)
Dept: FAMILY MEDICINE CLINIC | Facility: CLINIC | Age: 66
End: 2021-04-09

## 2021-04-09 VITALS
SYSTOLIC BLOOD PRESSURE: 122 MMHG | TEMPERATURE: 97.3 F | DIASTOLIC BLOOD PRESSURE: 80 MMHG | OXYGEN SATURATION: 98 % | HEIGHT: 68 IN | BODY MASS INDEX: 38.58 KG/M2 | WEIGHT: 254.6 LBS | RESPIRATION RATE: 20 BRPM | HEART RATE: 66 BPM

## 2021-04-09 DIAGNOSIS — G47.30 SLEEP DISORDER BREATHING: Primary | ICD-10-CM

## 2021-04-09 DIAGNOSIS — B37.2 INTERTRIGINOUS CANDIDIASIS: ICD-10-CM

## 2021-04-09 DIAGNOSIS — R53.83 OTHER FATIGUE: ICD-10-CM

## 2021-04-09 DIAGNOSIS — E66.9 OBESITY (BMI 30-39.9): ICD-10-CM

## 2021-04-09 DIAGNOSIS — E11.65 TYPE 2 DIABETES MELLITUS WITH HYPERGLYCEMIA, WITHOUT LONG-TERM CURRENT USE OF INSULIN (HCC): ICD-10-CM

## 2021-04-09 PROBLEM — N36.8: Status: ACTIVE | Noted: 2018-12-07

## 2021-04-09 PROBLEM — R60.0 PERIPHERAL EDEMA: Status: ACTIVE | Noted: 2018-11-02

## 2021-04-09 PROBLEM — R60.9 PERIPHERAL EDEMA: Status: ACTIVE | Noted: 2018-11-02

## 2021-04-09 PROBLEM — Z12.11 COLON CANCER SCREENING: Status: ACTIVE | Noted: 2018-03-13

## 2021-04-09 PROBLEM — F43.21 PROLONGED DEPRESSIVE ADJUSTMENT REACTION: Status: ACTIVE | Noted: 2018-11-02

## 2021-04-09 PROCEDURE — 99213 OFFICE O/P EST LOW 20 MIN: CPT | Performed by: FAMILY MEDICINE

## 2021-04-09 RX ORDER — SODIUM FLUORIDE 1.1 G/100G
GEL, DENTIFRICE ORAL SEE ADMIN INSTRUCTIONS
COMMUNITY
Start: 2021-01-16

## 2021-04-09 NOTE — PROGRESS NOTES
HPI  Selene Holley is a 66 y.o. female who is here for follow up of concerns of sleep apnea.  Patient reports daytime drowsiness and fatigue.  Says when he awakens in the morning does not feel rested.   apparently sleeps in a separate room because of snoring.  She is not aware of any sleep apnea episodes but is basically sleeping well.  Also reports palpitations during the day.  Had requested home sleep studies.  Does not feel would be able to undergo sleep lab studies.      Review of Systems   Constitutional: Positive for fatigue and unexpected weight change.   Skin: Positive for rash.   Psychiatric/Behavioral: Positive for sleep disturbance.   All other systems reviewed and are negative.        Past Medical History:   Diagnosis Date   • Diabetic peripheral neuropathy (CMS/HCC)    • DM (diabetes mellitus), type 2 (CMS/HCC)    • Hyperlipidemia    • Matta's neuroma of both feet    • Pancreatitis     GALLBLADDER PANCREATITIS   • Peripheral edema    • Situational depression      PROLONGED   • Urethral granuloma        Past Surgical History:   Procedure Laterality Date   • CHOLECYSTECTOMY     • COLONOSCOPY  04/2018    CLEAR/Q10 YEARS   • CYST REMOVAL      LEFT THIGH;41 YEARS AGO   • MYOMECTOMY  02/2002   • TONSILLECTOMY AND ADENOIDECTOMY     • TOTAL ABDOMINAL HYSTERECTOMY  2000     2 UTERINE FIBROIDS   • TOTAL KNEE ARTHROPLASTY Right 2017       Family History   Problem Relation Age of Onset   • Polycystic kidney disease Mother    • Polycystic kidney disease Brother    • Diabetes Brother    • Polycystic kidney disease Brother    • Breast cancer Sister         LATE 50'S   • Diabetes Sister    • Diabetes Maternal Aunt    • Hyperlipidemia Daughter    • Other Daughter         FATTY LIVER        Social History     Socioeconomic History   • Marital status:      Spouse name: Not on file   • Number of children: Not on file   • Years of education: Not on file   • Highest education level: Not on file   Tobacco Use    • Smoking status: Former Smoker   • Smokeless tobacco: Never Used   Substance and Sexual Activity   • Alcohol use: Yes   • Drug use: Never   • Sexual activity: Defer       Vitals:    04/09/21 1556   BP: 122/80   Pulse: 66   Resp: 20   Temp: 97.3 °F (36.3 °C)   SpO2: 98%        Body mass index is 38.71 kg/m².      Physical Exam  Vitals and nursing note reviewed.   Constitutional:       General: She is not in acute distress.     Appearance: She is well-developed.   HENT:      Head: Normocephalic and atraumatic.      Nose:      Comments: Patient with mask.  Provider with mask and shield  Eyes:      Conjunctiva/sclera: Conjunctivae normal.      Pupils: Pupils are equal, round, and reactive to light.   Neck:      Thyroid: No thyromegaly.   Cardiovascular:      Rate and Rhythm: Normal rate and regular rhythm.      Heart sounds: Normal heart sounds.   Pulmonary:      Effort: Pulmonary effort is normal. No respiratory distress.      Breath sounds: Normal breath sounds.   Abdominal:      General: There is no distension.      Palpations: Abdomen is soft. There is no mass.      Tenderness: There is no abdominal tenderness.      Hernia: No hernia is present.   Musculoskeletal:         General: No tenderness or deformity. Normal range of motion.      Cervical back: Normal range of motion.   Lymphadenopathy:      Cervical: No cervical adenopathy.   Skin:     General: Skin is warm and dry.      Coloration: Skin is not pale.      Findings: Erythema and rash present.          Neurological:      General: No focal deficit present.      Mental Status: She is alert and oriented to person, place, and time.      Motor: No abnormal muscle tone.      Coordination: Coordination normal.   Psychiatric:         Mood and Affect: Mood normal.         Behavior: Behavior normal.         Thought Content: Thought content normal.         Judgment: Judgment normal.           Assessment/Plan    Diagnoses and all orders for this visit:    1. Sleep  disorder breathing (Primary)    2. Intertriginous candidiasis    3. Type 2 diabetes mellitus with hyperglycemia, without long-term current use of insulin (CMS/[facility])    4. Obesity (BMI 30-39.9)      Patient here mostly requesting home sleep studies.  Reports feeling unrested in the morning.   apparently sleeps in a separate room because of snoring.  Strongly suspect possibility of sleep apnea.  Discussed referral to sleep specialist versus home sleep study as previously ordered.  Also continues with rash and skin fold lower abdomen but seems to be improving with topical therapy.  Call if symptoms do not continue to resolve.  Otherwise keep routine follow-up appointment for diabetes and other issues.    This note includes information entered using a voice recognition dictation system.  Though reviewed, some nonsensible errors may remain.

## 2021-04-12 ENCOUNTER — TELEPHONE (OUTPATIENT)
Dept: FAMILY MEDICINE CLINIC | Facility: CLINIC | Age: 66
End: 2021-04-12

## 2021-04-12 DIAGNOSIS — G47.33 OSA (OBSTRUCTIVE SLEEP APNEA): Primary | ICD-10-CM

## 2021-04-12 NOTE — TELEPHONE ENCOUNTER
PATIENT CALLED TO SPEAK WITH KAZ. ATTEMPTED WARM TRANSFER UNSUCCESSFUL. PLEASE CALL BACK [phone].

## 2021-04-12 NOTE — TELEPHONE ENCOUNTER
ROSANNE WITH [facility] CALLED AND STATED THEY HAVE RECEIVED A REFERRAL TO HAVE PATIENT SET UP FOR A SLEEP STUDY, BUT NOTES ARE MISSING INFORMATION TO GET THE PATIENT SET UP.     ROSANNE IS REQUESTING A CALLBACK.    ROSANNE'S CALLBACK: [phone]

## 2021-04-12 NOTE — TELEPHONE ENCOUNTER
ROSANNE RETURNING KAZ'S CALL.        ATTEMTED WARM TRANSFER, UNSUCCESSFUL.    ROSANNE [phone]

## 2021-05-25 RX ORDER — ESCITALOPRAM OXALATE 20 MG/1
TABLET ORAL
Qty: 90 TABLET | Refills: 1 | Status: SHIPPED | OUTPATIENT
Start: 2021-05-25 | End: 2022-01-14

## 2021-06-04 DIAGNOSIS — I10 ESSENTIAL HYPERTENSION: Primary | ICD-10-CM

## 2021-06-04 DIAGNOSIS — F41.9 ANXIETY: ICD-10-CM

## 2021-06-04 RX ORDER — DIAZEPAM 2 MG/1
2-4 TABLET ORAL DAILY PRN
Qty: 20 TABLET | Refills: 1 | Status: SHIPPED | OUTPATIENT
Start: 2021-06-04 | End: 2021-12-20

## 2021-06-04 RX ORDER — LISINOPRIL 2.5 MG/1
2.5 TABLET ORAL DAILY
Qty: 90 TABLET | Refills: 2 | Status: SHIPPED | OUTPATIENT
Start: 2021-06-04 | End: 2022-04-11

## 2021-06-04 NOTE — TELEPHONE ENCOUNTER
Caller: Selene Holley    Relationship: Self    Best call back number: [phone]    Medication needed:   Requested Prescriptions     Pending Prescriptions Disp Refills   • diazePAM (VALIUM) 2 MG tablet 20 tablet 1     Sig: Take 1-2 tablets by mouth Daily As Needed for Anxiety.   • lisinopril (PRINIVIL,ZESTRIL) 2.5 MG tablet 90 tablet 2     Sig: Take 1 tablet by mouth Daily.       When do you need the refill by: ASAP    What additional details did the patient provide when requesting the medication: PATIENT STATES SHE IS OUT, HAS APPOINTMENT SCHEDULED FOR 6/28/2021    Does the patient have less than a 3 day supply:  [x] Yes  [] No    What is the patient's preferred pharmacy: [id number] - [place], [place] - [address] AT [address] - [phone] [facility] [phone] FX

## 2021-06-29 ENCOUNTER — TELEPHONE (OUTPATIENT)
Dept: FAMILY MEDICINE CLINIC | Facility: CLINIC | Age: 66
End: 2021-06-29
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
## 2021-06-29 NOTE — TELEPHONE ENCOUNTER
Patient is going to call her insurance company to find out if the specialized screening will be covered.  She will call back to schedule a lab appt if needed.    Hub please inform patient

## 2021-06-30 ENCOUNTER — APPOINTMENT (OUTPATIENT)
Dept: SLEEP MEDICINE | Facility: HOSPITAL | Age: 66
End: 2021-06-30

## 2021-08-20 ENCOUNTER — OFFICE VISIT (OUTPATIENT)
Dept: FAMILY MEDICINE CLINIC | Facility: CLINIC | Age: 66
End: 2021-08-20

## 2021-08-20 DIAGNOSIS — E11.65 TYPE 2 DIABETES MELLITUS WITH HYPERGLYCEMIA, WITHOUT LONG-TERM CURRENT USE OF INSULIN (HCC): Primary | ICD-10-CM

## 2021-08-20 DIAGNOSIS — F41.9 ANXIETY AND DEPRESSION: ICD-10-CM

## 2021-08-20 DIAGNOSIS — F32.A ANXIETY AND DEPRESSION: ICD-10-CM

## 2021-08-20 DIAGNOSIS — Z11.59 ENCOUNTER FOR HEPATITIS C SCREENING TEST FOR LOW RISK PATIENT: ICD-10-CM

## 2021-08-20 DIAGNOSIS — E78.5 HYPERLIPIDEMIA, UNSPECIFIED HYPERLIPIDEMIA TYPE: ICD-10-CM

## 2021-08-20 DIAGNOSIS — Z00.00 HEALTH CARE MAINTENANCE: ICD-10-CM

## 2021-08-20 DIAGNOSIS — Z79.899 HIGH RISK MEDICATION USE: ICD-10-CM

## 2021-08-20 PROCEDURE — 99397 PER PM REEVAL EST PAT 65+ YR: CPT | Performed by: FAMILY MEDICINE

## 2021-08-20 RX ORDER — DOXYCYCLINE HYCLATE 100 MG/1
CAPSULE ORAL
COMMUNITY
Start: 2021-07-08 | End: 2022-03-18

## 2021-08-20 RX ORDER — CHLORHEXIDINE GLUCONATE 0.12 MG/ML
RINSE ORAL
COMMUNITY
Start: 2021-07-08 | End: 2022-10-10

## 2021-08-20 RX ORDER — AMOXICILLIN 500 MG/1
CAPSULE ORAL
COMMUNITY
Start: 2021-07-08 | End: 2022-03-18

## 2021-08-20 NOTE — PROGRESS NOTES
HPI  Selene Holley is a 66 y.o. female who is here for annual?  Says previous physician in [place] get annual physical and apparently last visit was in November.  Apparently this included pelvic exam though patient said she does not need Pap smears anymore because has had hysterectomy etc.  Has been off work and admits to dietary indiscretions and decreased activity level.  Plans on resuming diet and exercise program.  Follow-up this is encouraged.  Extensive history reviewed as below.  Apparently has had no recurrence of pancreatitis or other issues.      Review of Systems   Cardiovascular: Positive for leg swelling.   All other systems reviewed and are negative.        Past Medical History:   Diagnosis Date   • Diabetic peripheral neuropathy (CMS/HCC)    • DM (diabetes mellitus), type 2 (CMS/HCC)    • Hyperlipidemia    • Matta's neuroma of both feet    • Pancreatitis     GALLBLADDER PANCREATITIS   • Peripheral edema    • Situational depression      PROLONGED   • Urethral granuloma        Past Surgical History:   Procedure Laterality Date   • CHOLECYSTECTOMY     • COLONOSCOPY  04/2018    CLEAR/Q10 YEARS   • CYST REMOVAL      LEFT THIGH;41 YEARS AGO   • MYOMECTOMY  02/2002   • TONSILLECTOMY AND ADENOIDECTOMY     • TOTAL ABDOMINAL HYSTERECTOMY  2000     2 UTERINE FIBROIDS   • TOTAL KNEE ARTHROPLASTY Right 2017       Family History   Problem Relation Age of Onset   • Polycystic kidney disease Mother    • Polycystic kidney disease Brother    • Diabetes Brother    • Polycystic kidney disease Brother    • Breast cancer Sister         LATE 50'S   • Diabetes Sister    • Diabetes Maternal Aunt    • Hyperlipidemia Daughter    • Other Daughter         FATTY LIVER        Social History     Socioeconomic History   • Marital status:      Spouse name: Not on file   • Number of children: Not on file   • Years of education: Not on file   • Highest education level: Not on file   Tobacco Use   • Smoking status: Former Smoker    • Smokeless tobacco: Never Used   Substance and Sexual Activity   • Alcohol use: Yes   • Drug use: Never   • Sexual activity: Defer       Vitals:    08/20/21 1054   BP: 110/70   Pulse: 55   Temp: 98.3 °F (36.8 °C)   SpO2: 98%        Body mass index is 36.8 kg/m².      Physical Exam  Vitals and nursing note reviewed.   Constitutional:       General: She is not in acute distress.     Appearance: She is well-developed. She is obese.   HENT:      Head: Normocephalic and atraumatic.      Nose:      Comments: Patient with mask.  Provider with mask and shield  Eyes:      Extraocular Movements: Extraocular movements intact.      Conjunctiva/sclera: Conjunctivae normal.      Pupils: Pupils are equal, round, and reactive to light.   Neck:      Thyroid: No thyromegaly.   Cardiovascular:      Rate and Rhythm: Normal rate and regular rhythm.      Heart sounds: Normal heart sounds.   Pulmonary:      Effort: Pulmonary effort is normal. No respiratory distress.      Breath sounds: Normal breath sounds.   Abdominal:      General: There is no distension.      Palpations: Abdomen is soft. There is no mass.      Tenderness: There is no abdominal tenderness.      Hernia: No hernia is present.   Musculoskeletal:         General: No tenderness or deformity. Normal range of motion.      Cervical back: Normal range of motion.   Lymphadenopathy:      Cervical: No cervical adenopathy.   Skin:     General: Skin is warm and dry.      Coloration: Skin is not pale.      Findings: No rash.   Neurological:      General: No focal deficit present.      Mental Status: She is alert and oriented to person, place, and time.      Motor: No abnormal muscle tone.      Coordination: Coordination normal.   Psychiatric:         Mood and Affect: Mood normal.         Behavior: Behavior normal.         Thought Content: Thought content normal.         Judgment: Judgment normal.           Assessment/Plan    Diagnoses and all orders for this visit:    1. Type 2  diabetes mellitus with hyperglycemia, without long-term current use of insulin (CMS/[facility]) (Primary)  -     Comprehensive Metabolic Panel  -     Lipid Panel  -     Hemoglobin A1c    2. Hyperlipidemia, unspecified hyperlipidemia type  -     Lipid Panel    3. Anxiety and depression    4. High risk medication use  -     Comprehensive Metabolic Panel  -     CBC & Differential    5. Encounter for hepatitis C screening test for low risk patient  -     Hepatitis C Antibody    6. Health care maintenance        Patient says here for annual physical.  Does have diabetes hyperlipidemia hypertension and multiple medical issues.  Mostly a coding issue but will code as physical?  Await lab work to determine next visit.  Encouraged resuming healthy diet and exercise program.  Also recommended getting Covid vaccine despite the fact she had Covid infection several months ago.    This note includes information entered using a voice recognition dictation system.

## 2021-08-21 LAB
ALBUMIN SERPL-MCNC: 4.3 G/DL (ref 3.5–5.2)
ALBUMIN/GLOB SERPL: 2 G/DL
ALP SERPL-CCNC: 71 U/L (ref 39–117)
ALT SERPL-CCNC: 14 U/L (ref 1–33)
AST SERPL-CCNC: 16 U/L (ref 1–32)
BASOPHILS # BLD AUTO: 0.05 10*3/MM3 (ref 0–0.2)
BASOPHILS NFR BLD AUTO: 1.1 % (ref 0–1.5)
BILIRUB SERPL-MCNC: 0.5 MG/DL (ref 0–1.2)
BUN SERPL-MCNC: 13 MG/DL (ref 8–23)
BUN/CREAT SERPL: 19.7 (ref 7–25)
CALCIUM SERPL-MCNC: 9.9 MG/DL (ref 8.6–10.5)
CHLORIDE SERPL-SCNC: 100 MMOL/L (ref 98–107)
CHOLEST SERPL-MCNC: 211 MG/DL (ref 0–200)
CO2 SERPL-SCNC: 27 MMOL/L (ref 22–29)
CREAT SERPL-MCNC: 0.66 MG/DL (ref 0.57–1)
EOSINOPHIL # BLD AUTO: 0.06 10*3/MM3 (ref 0–0.4)
EOSINOPHIL NFR BLD AUTO: 1.3 % (ref 0.3–6.2)
ERYTHROCYTE [DISTWIDTH] IN BLOOD BY AUTOMATED COUNT: 13.4 % (ref 12.3–15.4)
GLOBULIN SER CALC-MCNC: 2.2 GM/DL
GLUCOSE SERPL-MCNC: 138 MG/DL (ref 65–99)
HBA1C MFR BLD: 7 % (ref 4.8–5.6)
HCT VFR BLD AUTO: 42.2 % (ref 34–46.6)
HCV AB S/CO SERPL IA: <0.1 S/CO RATIO (ref 0–0.9)
HDLC SERPL-MCNC: 50 MG/DL (ref 40–60)
HGB BLD-MCNC: 13.8 G/DL (ref 12–15.9)
IMM GRANULOCYTES # BLD AUTO: 0.02 10*3/MM3 (ref 0–0.05)
IMM GRANULOCYTES NFR BLD AUTO: 0.4 % (ref 0–0.5)
LDLC SERPL CALC-MCNC: 128 MG/DL (ref 0–100)
LYMPHOCYTES # BLD AUTO: 1.46 10*3/MM3 (ref 0.7–3.1)
LYMPHOCYTES NFR BLD AUTO: 32 % (ref 19.6–45.3)
MCH RBC QN AUTO: 29.9 PG (ref 26.6–33)
MCHC RBC AUTO-ENTMCNC: 32.7 G/DL (ref 31.5–35.7)
MCV RBC AUTO: 91.5 FL (ref 79–97)
MONOCYTES # BLD AUTO: 0.43 10*3/MM3 (ref 0.1–0.9)
MONOCYTES NFR BLD AUTO: 9.4 % (ref 5–12)
NEUTROPHILS # BLD AUTO: 2.54 10*3/MM3 (ref 1.7–7)
NEUTROPHILS NFR BLD AUTO: 55.8 % (ref 42.7–76)
NRBC BLD AUTO-RTO: 0 /100 WBC (ref 0–0.2)
PLATELET # BLD AUTO: 188 10*3/MM3 (ref 140–450)
POTASSIUM SERPL-SCNC: 4.4 MMOL/L (ref 3.5–5.2)
PROT SERPL-MCNC: 6.5 G/DL (ref 6–8.5)
RBC # BLD AUTO: 4.61 10*6/MM3 (ref 3.77–5.28)
SODIUM SERPL-SCNC: 139 MMOL/L (ref 136–145)
TRIGL SERPL-MCNC: 186 MG/DL (ref 0–150)
VLDLC SERPL CALC-MCNC: 33 MG/DL (ref 5–40)
WBC # BLD AUTO: 4.56 10*3/MM3 (ref 3.4–10.8)

## 2021-08-24 ENCOUNTER — TELEPHONE (OUTPATIENT)
Dept: FAMILY MEDICINE CLINIC | Facility: CLINIC | Age: 66
End: 2021-08-24

## 2021-08-24 VITALS
WEIGHT: 252 LBS | DIASTOLIC BLOOD PRESSURE: 70 MMHG | TEMPERATURE: 98.3 F | HEIGHT: 68 IN | OXYGEN SATURATION: 98 % | BODY MASS INDEX: 38.19 KG/M2 | SYSTOLIC BLOOD PRESSURE: 110 MMHG | HEART RATE: 55 BPM

## 2021-08-24 NOTE — TELEPHONE ENCOUNTER
Caller: Kishan Selene LUIS MIGUEL    Relationship: Self    Best call back number:[phone]     Caller requesting test results:     What test was performed: LABS    When was the test performed: 08/20/21    Where was the test performed: IN OFFICE    Additional notes: PATIENT CALLING WANTING TO KNOW IF THE RESULTS ARE IN YET.

## 2021-10-06 ENCOUNTER — OFFICE VISIT (OUTPATIENT)
Dept: SLEEP MEDICINE | Facility: HOSPITAL | Age: 66
End: 2021-10-06

## 2021-10-06 VITALS
HEIGHT: 68 IN | SYSTOLIC BLOOD PRESSURE: 133 MMHG | OXYGEN SATURATION: 97 % | BODY MASS INDEX: 38.04 KG/M2 | HEART RATE: 67 BPM | DIASTOLIC BLOOD PRESSURE: 64 MMHG | WEIGHT: 251 LBS

## 2021-10-06 DIAGNOSIS — G47.30 SLEEP APNEA, UNSPECIFIED TYPE: Primary | ICD-10-CM

## 2021-10-06 DIAGNOSIS — G47.8 NON-RESTORATIVE SLEEP: ICD-10-CM

## 2021-10-06 DIAGNOSIS — G47.21 CIRCADIAN RHYTHM SLEEP DISORDER, DELAYED SLEEP PHASE TYPE: ICD-10-CM

## 2021-10-06 DIAGNOSIS — G47.10 HYPERSOMNIA: ICD-10-CM

## 2021-10-06 DIAGNOSIS — G47.63 SLEEP-RELATED BRUXISM: ICD-10-CM

## 2021-10-06 DIAGNOSIS — E66.01 CLASS 2 SEVERE OBESITY WITH SERIOUS COMORBIDITY AND BODY MASS INDEX (BMI) OF 38.0 TO 38.9 IN ADULT, UNSPECIFIED OBESITY TYPE (HCC): ICD-10-CM

## 2021-10-06 PROCEDURE — 99204 OFFICE O/P NEW MOD 45 MIN: CPT | Performed by: FAMILY MEDICINE

## 2021-10-06 PROCEDURE — G0463 HOSPITAL OUTPT CLINIC VISIT: HCPCS

## 2021-10-06 NOTE — PROGRESS NOTES
Sleep Disorders Center New Patient/Consultation       Reason for Consultation: Snoring hypersomnia nonrestorative sleep      Patient Care Team:  Tato Rajput MD as PCP - General (Family Medicine)  Provider, No Known as PCP - Family Medicine  Sofia Mancera MD (Inactive)  Mj Wells MD as Consulting Physician (Sleep Medicine)      History of present illness:  Thank you for asking me to see your patient.  The patient is a 66 y.o. female with type 2 diabetes mellitus history of acute pancreatitis anxiety depression osteoarthritis and hyperlipidemia presents today with concern for sleep apnea.  Patient reports snoring hypersomnia nonrestorative sleep.  Unsure about witnessed apneas as  sleeps in a separate bedroom due to her loud snoring.  Patient reports snoring tossing and turning and difficulty falling asleep at night.  Patient also reports sleep-related bruxism.  Also reports being a night owl; is trying to change her sleep rhythm so she sleeps earlier wakes up earlier.    Sleep Schedule:  Bed time: 11 PM to 12 AM  Sleep latency: Forever  Wake time: 6:30 AM  Average hours slept: 6-7  Non-restorative sleep: Yes  Number of naps per day: 0  Rotating shifts?:  No  Nocturia: No  Electronics in bedroom: Y    Excessive daytime sleepiness or drowsiness:Y  Any accidents at work due to sleepiness in the last 5 years:N  Any difficulty driving due to sleepiness or being drowsy: N  Weight changed in the last 5 years:N    Snoring:Y  Witnessed apneas:UNSURE  Have you ever awakened gasping for breath, coughing, choking or respiratory discomfort: N  Morning headaches: N  Awaken with a sore throat or dry mouth: N    Any reports of leg jerking at night: N  Urge sensations: N  Does pain disrupt sleep: N  Sweating during sleep: N  Teeth grinding: N    Any sudden episodes of sleep during the day: N  Sleep paralysis/hallucinations: N  Muscle weakness with laughing/anger: N  Nightmares: N  Sleep walking: N    Are you  "sleepy when you increase your sleep time: N  Do you sleep better away from your own bed: N    ESS: 6    Social History: [employer]; smoke cigarettes ranges 20-60; rare alcohol use 2 coffees a day no drug use    Review of Systems:    A complete review of systems was done and all were negative with the exception of all negative    Allergies:  Codeine    Family History: MARYANNE no       Current Outpatient Medications:   •  amoxicillin (AMOXIL) 500 MG capsule, TAKE 4 CAPSULES BY MOUTH 1 HOUR PRIOR TO DENTAL APPOINTMENT, Disp: , Rfl:   •  chlorhexidine (PERIDEX) 0.12 % solution, RINSE 15ML FOR 30 SECONDS THEN SPIT TWICE A DAY, Disp: , Rfl:   •  Denta 5000 Plus 1.1 % cream, See Admin Instructions., Disp: , Rfl:   •  diazePAM (VALIUM) 2 MG tablet, Take 1-2 tablets by mouth Daily As Needed for Anxiety., Disp: 20 tablet, Rfl: 1  •  doxycycline (VIBRAMYCIN) 100 MG capsule, TAKE ONE TABLET BY MOUTH TWICE DAILY FOR 14 DAYS, Disp: , Rfl:   •  escitalopram (LEXAPRO) 20 MG tablet, TAKE 1 TABLET BY MOUTH EVERY DAY, Disp: 90 tablet, Rfl: 1  •  lisinopril (PRINIVIL,ZESTRIL) 2.5 MG tablet, Take 1 tablet by mouth Daily., Disp: 90 tablet, Rfl: 2  •  metFORMIN ER (GLUCOPHAGE-XR) 500 MG 24 hr tablet, TAKE 1 TABLET BY MOUTH EVERY DAY, Disp: 90 tablet, Rfl: 1  •  nystatin (MYCOSTATIN) 901105 UNIT/GM cream, Apply  topically to the appropriate area as directed 2 (two) times a day., Disp: 30 g, Rfl: 1  •  pravastatin (PRAVACHOL) 40 MG tablet, Take 1 tablet by mouth Every Night., Disp: 90 tablet, Rfl: 3  •  vitamin D3 125 MCG (5000 UT) capsule capsule, Take 5,000 Units by mouth Daily., Disp: , Rfl:     Vital Signs:    Vitals:    10/06/21 0900   BP: 133/64   Pulse: 67   SpO2: 97%   Weight: 114 kg (251 lb)   Height: 172.7 cm (68\")      Body mass index is 38.16 kg/m².  Neck Circumference: 16 inches      Physical Exam:   General Alert and oriented. No acute distress noted   Pharynx/Throat Class IV Mallampati airway, large tongue, no evidence of " redundant lateral pharyngeal tissue. No oral lesions. No thrush. Moist mucous membranes.   Head Normocephalic. Symmetrical. Atraumatic.    Nose No septal deviation. No drainage   Chest Wall Normal shape. Symmetric expansion with respiration. No tenderness.   Neck Trachea midline, no thyromegaly or adenopathy    Lungs Clear to auscultation bilaterally. No wheezes. No rhonchi. No rales. Respirations regular, even and unlabored.   Heart Regular rhythm and normal rate. Normal S1 and S2. No murmur   Abdomen Soft, non-tender and non-distended. Normal bowel sounds. No masses.   Extremities Moves all extremities well. No edema   Psychiatric Normal mood and affect.       Impression:  1. Sleep apnea, unspecified type    2. Hypersomnia    3. Non-restorative sleep    4. Class 2 severe obesity with serious comorbidity and body mass index (BMI) of 38.0 to 38.9 in adult, unspecified obesity type (HCC)    5. Sleep-related bruxism    6. Circadian rhythm sleep disorder, delayed sleep phase type        Plan:    Good sleep hygiene measures should be maintained.  Weight loss would be beneficial in this patient who is obese with BMI 38.2.    I discussed the pathophysiology of obstructive sleep apnea with the patient.  We discussed the adverse outcomes associated with untreated sleep-disordered breathing.  We discussed treatment modalities of obstructive sleep apnea including CPAP device as well as oral mandibular advancement device. Sleep study will be scheduled to establish definitive diagnosis of sleep disorder breathing.  Weight loss will be strongly beneficial in order to reduce the severity of sleep-disordered breathing.  Patient has narrow oropharyngeal structure.  Caution during activities that require prolonged concentration is strongly advised.  Patient will be notified of sleep study results after sleep study is completed.  If sleep apnea is only mild,  oral mandibular advancement device may be one of the treatment options.   However if sleep apnea is moderately severe, CPAP treatment will be strongly encouraged.  The patient is not opposed to treatment with CPAP device if we confirm significant obstructive sleep apnea on polysomnography.     Discussed melatonin use nightly to help with setting earlier bedtime and light therapy every morning to help with setting appropriate wake time.    Thank you for allowing me to participate in your patient's care.    Mj Wells MD  Sleep Medicine  10/06/21  09:37 EDT

## 2021-10-07 RX ORDER — METFORMIN HYDROCHLORIDE 500 MG/1
TABLET, EXTENDED RELEASE ORAL
Qty: 90 TABLET | Refills: 1 | Status: SHIPPED | OUTPATIENT
Start: 2021-10-07 | End: 2022-04-11

## 2021-10-22 ENCOUNTER — HOSPITAL ENCOUNTER (OUTPATIENT)
Dept: SLEEP MEDICINE | Facility: HOSPITAL | Age: 66
Discharge: HOME | End: 2021-10-22
Admitting: FAMILY MEDICINE

## 2021-10-22 DIAGNOSIS — G47.8 NON-RESTORATIVE SLEEP: ICD-10-CM

## 2021-10-22 DIAGNOSIS — G47.30 SLEEP APNEA, UNSPECIFIED TYPE: ICD-10-CM

## 2021-10-22 DIAGNOSIS — G47.63 SLEEP-RELATED BRUXISM: ICD-10-CM

## 2021-10-22 DIAGNOSIS — G47.10 HYPERSOMNIA: ICD-10-CM

## 2021-10-22 DIAGNOSIS — E66.01 CLASS 2 SEVERE OBESITY WITH SERIOUS COMORBIDITY AND BODY MASS INDEX (BMI) OF 38.0 TO 38.9 IN ADULT, UNSPECIFIED OBESITY TYPE (HCC): ICD-10-CM

## 2021-10-22 PROCEDURE — 95806 SLEEP STUDY UNATT&RESP EFFT: CPT

## 2021-10-22 PROCEDURE — 95806 SLEEP STUDY UNATT&RESP EFFT: CPT | Performed by: FAMILY MEDICINE

## 2021-11-16 ENCOUNTER — OFFICE VISIT (OUTPATIENT)
Dept: SLEEP MEDICINE | Facility: HOSPITAL | Age: 66
End: 2021-11-16

## 2021-11-16 VITALS
BODY MASS INDEX: 38.04 KG/M2 | DIASTOLIC BLOOD PRESSURE: 66 MMHG | OXYGEN SATURATION: 97 % | WEIGHT: 251 LBS | SYSTOLIC BLOOD PRESSURE: 134 MMHG | HEART RATE: 58 BPM | HEIGHT: 68 IN

## 2021-11-16 DIAGNOSIS — G47.8 NON-RESTORATIVE SLEEP: ICD-10-CM

## 2021-11-16 DIAGNOSIS — G47.10 HYPERSOMNIA: ICD-10-CM

## 2021-11-16 DIAGNOSIS — G47.21 CIRCADIAN RHYTHM SLEEP DISORDER, DELAYED SLEEP PHASE TYPE: ICD-10-CM

## 2021-11-16 DIAGNOSIS — E66.01 CLASS 2 SEVERE OBESITY WITH SERIOUS COMORBIDITY AND BODY MASS INDEX (BMI) OF 38.0 TO 38.9 IN ADULT, UNSPECIFIED OBESITY TYPE (HCC): ICD-10-CM

## 2021-11-16 DIAGNOSIS — G47.63 SLEEP-RELATED BRUXISM: ICD-10-CM

## 2021-11-16 DIAGNOSIS — G47.33 OBSTRUCTIVE SLEEP APNEA: Primary | ICD-10-CM

## 2021-11-16 PROCEDURE — 99213 OFFICE O/P EST LOW 20 MIN: CPT | Performed by: FAMILY MEDICINE

## 2021-11-16 PROCEDURE — G0463 HOSPITAL OUTPT CLINIC VISIT: HCPCS

## 2021-11-16 NOTE — PROGRESS NOTES
Follow Up Sleep Disorders Center Note     Chief Complaint:  MARYANNE     Primary Care Physician: Tato Rajput MD    Selene Holley is a 66 y.o.female  was last seen at [facility] sleep lab: 10/22/2021 for home sleep study.  Patient presents today to discuss results and treatment options.  She reported snoring hypersomnia nonrestorative sleep.  Also reported sleep-related bruxism.    Sleep study showed overall AHI 20.4 with lowest oxygen saturation of 81%.    Discussed auto CPAP versus oral mandibular device.      Current Medications:    Current Outpatient Medications:   •  amoxicillin (AMOXIL) 500 MG capsule, TAKE 4 CAPSULES BY MOUTH 1 HOUR PRIOR TO DENTAL APPOINTMENT, Disp: , Rfl:   •  chlorhexidine (PERIDEX) 0.12 % solution, RINSE 15ML FOR 30 SECONDS THEN SPIT TWICE A DAY, Disp: , Rfl:   •  Denta 5000 Plus 1.1 % cream, See Admin Instructions., Disp: , Rfl:   •  diazePAM (VALIUM) 2 MG tablet, Take 1-2 tablets by mouth Daily As Needed for Anxiety., Disp: 20 tablet, Rfl: 1  •  doxycycline (VIBRAMYCIN) 100 MG capsule, TAKE ONE TABLET BY MOUTH TWICE DAILY FOR 14 DAYS, Disp: , Rfl:   •  escitalopram (LEXAPRO) 20 MG tablet, TAKE 1 TABLET BY MOUTH EVERY DAY, Disp: 90 tablet, Rfl: 1  •  lisinopril (PRINIVIL,ZESTRIL) 2.5 MG tablet, Take 1 tablet by mouth Daily., Disp: 90 tablet, Rfl: 2  •  metFORMIN ER (GLUCOPHAGE-XR) 500 MG 24 hr tablet, TAKE 1 TABLET BY MOUTH EVERY DAY, Disp: 90 tablet, Rfl: 1  •  nystatin (MYCOSTATIN) 279112 UNIT/GM cream, Apply  topically to the appropriate area as directed 2 (two) times a day., Disp: 30 g, Rfl: 1  •  pravastatin (PRAVACHOL) 40 MG tablet, Take 1 tablet by mouth Every Night., Disp: 90 tablet, Rfl: 3  •  vitamin D3 125 MCG (5000 UT) capsule capsule, Take 5,000 Units by mouth Daily., Disp: , Rfl:    also entered in Sleep Questionnaire    Patient  has a past medical history of Diabetic peripheral neuropathy (CMS/HCC), DM (diabetes mellitus), type 2 (CMS/HCC), Hyperlipidemia, Matta's neuroma of both  "feet, Pancreatitis, Peripheral edema, Situational depression, and Urethral granuloma.    Social History:    Social History     Socioeconomic History   • Marital status:    Tobacco Use   • Smoking status: Former Smoker   • Smokeless tobacco: Never Used   Substance and Sexual Activity   • Alcohol use: Yes   • Drug use: Never   • Sexual activity: Defer       Allergies:  Codeine    Review of Systems:    A complete review of systems was done and all were negative with the exception of postnasal drip depression ear pain    Vital Signs:    Vitals:    11/16/21 1500   BP: 134/66   Pulse: 58   SpO2: 97%   Weight: 114 kg (251 lb)   Height: 172.7 cm (68\")     Body mass index is 38.16 kg/m².    Vital Signs /66   Pulse 58   Ht 172.7 cm (68\")   Wt 114 kg (251 lb)   SpO2 97%   BMI 38.16 kg/m²  Body mass index is 38.16 kg/m².    General Alert and oriented. No acute distress noted   Pharynx/Throat Class IV Mallampati airway, large tongue, no evidence of redundant lateral pharyngeal tissue. No oral lesions. No thrush. Moist mucous membranes.   Head Normocephalic. Symmetrical. Atraumatic.    Nose No septal deviation. No drainage   Chest Wall Normal shape. Symmetric expansion with respiration. No tenderness.   Neck Trachea midline, no thyromegaly or adenopathy    Lungs Clear to auscultation bilaterally. No wheezes. No rhonchi. No rales. Respirations regular, even and unlabored.   Heart Regular rhythm and normal rate. Normal S1 and S2. No murmur   Abdomen Soft, non-tender and non-distended. Normal bowel sounds. No masses.   Extremities Moves all extremities well. No edema   Psychiatric Normal mood and affect.     Impression:  1. Obstructive sleep apnea    2. Class 2 severe obesity with serious comorbidity and body mass index (BMI) of 38.0 to 38.9 in adult, unspecified obesity type (HCC)    3. Hypersomnia    4. Non-restorative sleep    5. Sleep-related bruxism    6. Circadian rhythm sleep disorder, delayed sleep phase " type        Prefers auto CPAP will start 6-15 cm H2O return to clinic after 6 weeks of follow-up or sooner if needed.  Weight loss will be strongly beneficial to reduce the severity of sleep-disordered breathing.  Caution during activities that require prolonged concentration is strongly advised if sleepiness returns. Changing of PAP supplies regularly is important for effective use. Patient needs to change cushion on the mask or plugs on nasal pillows along with disposable filters once every month and change mask frame, tubing, headgear and Velcro straps every 6 months at the minimum.    Time spent during visit: 20 minutes of which at least 50% of the time was spent counseling patient.    Mj Wells MD  Sleep Medicine  11/16/21  16:37 EST

## 2021-11-17 ENCOUNTER — TELEPHONE (OUTPATIENT)
Dept: SLEEP MEDICINE | Facility: HOSPITAL | Age: 66
End: 2021-11-17

## 2021-11-17 NOTE — TELEPHONE ENCOUNTER
Spoke with patient, faxinf orders to [facility], patient requested DME on [facility].

## 2021-12-19 DIAGNOSIS — F41.9 ANXIETY: ICD-10-CM

## 2021-12-20 RX ORDER — DIAZEPAM 2 MG/1
2-4 TABLET ORAL DAILY PRN
Qty: 20 TABLET | Refills: 0 | Status: SHIPPED | OUTPATIENT
Start: 2021-12-20 | End: 2022-11-03 | Stop reason: SDUPTHER

## 2022-01-14 RX ORDER — ESCITALOPRAM OXALATE 20 MG/1
TABLET ORAL
Qty: 90 TABLET | Refills: 1 | Status: SHIPPED | OUTPATIENT
Start: 2022-01-14 | End: 2022-09-07 | Stop reason: SDUPTHER

## 2022-02-10 DIAGNOSIS — Z12.31 BREAST CANCER SCREENING BY MAMMOGRAM: Primary | ICD-10-CM

## 2022-02-22 ENCOUNTER — OFFICE VISIT (OUTPATIENT)
Dept: SLEEP MEDICINE | Facility: HOSPITAL | Age: 67
End: 2022-02-22

## 2022-02-22 VITALS
OXYGEN SATURATION: 98 % | BODY MASS INDEX: 38.58 KG/M2 | HEART RATE: 62 BPM | WEIGHT: 254.6 LBS | HEIGHT: 68 IN | DIASTOLIC BLOOD PRESSURE: 72 MMHG | SYSTOLIC BLOOD PRESSURE: 127 MMHG

## 2022-02-22 DIAGNOSIS — G47.33 OBSTRUCTIVE SLEEP APNEA: Primary | ICD-10-CM

## 2022-02-22 DIAGNOSIS — E66.9 OBESITY (BMI 30-39.9): ICD-10-CM

## 2022-02-22 PROCEDURE — G0463 HOSPITAL OUTPT CLINIC VISIT: HCPCS

## 2022-02-22 PROCEDURE — 99213 OFFICE O/P EST LOW 20 MIN: CPT | Performed by: FAMILY MEDICINE

## 2022-02-22 NOTE — PROGRESS NOTES
Follow Up Sleep Disorders Center Note     Chief Complaint:  MARYANNE     Primary Care Physician: Tato Rajput MD    Selene Holley is a 66 y.o.female  was last seen at [facility] sleep lab: 11/16/2021 to discuss on sleep study results which showed overall AHI 20.4 with lowest SPO2 81%.  Started auto CPAP 6-15 cm H2O.  Presents today for first follow-up visit.  No problems with machine.  Great improvement in hypersomnia and restorative sleep.  May call DME to consider a different mask; currently has full facemask which fits well however could fit better.    Results Review:  DME is [facility].  Downloads between last 30 days.  Average usage is 5 hours 31 minutes.  Average AHI is 2.3.  Average AutoCPAP pressure is 11.3 cm H2O.    Current Medications:    Current Outpatient Medications:   •  amoxicillin (AMOXIL) 500 MG capsule, TAKE 4 CAPSULES BY MOUTH 1 HOUR PRIOR TO DENTAL APPOINTMENT, Disp: , Rfl:   •  chlorhexidine (PERIDEX) 0.12 % solution, RINSE 15ML FOR 30 SECONDS THEN SPIT TWICE A DAY, Disp: , Rfl:   •  Denta 5000 Plus 1.1 % cream, See Admin Instructions., Disp: , Rfl:   •  diazePAM (VALIUM) 2 MG tablet, TAKE 1-2 TABLETS BY MOUTH DAILY AS NEEDED FOR ANXIETY., Disp: 20 tablet, Rfl: 0  •  doxycycline (VIBRAMYCIN) 100 MG capsule, TAKE ONE TABLET BY MOUTH TWICE DAILY FOR 14 DAYS, Disp: , Rfl:   •  escitalopram (LEXAPRO) 20 MG tablet, TAKE 1 TABLET BY MOUTH EVERY DAY, Disp: 90 tablet, Rfl: 1  •  lisinopril (PRINIVIL,ZESTRIL) 2.5 MG tablet, Take 1 tablet by mouth Daily., Disp: 90 tablet, Rfl: 2  •  metFORMIN ER (GLUCOPHAGE-XR) 500 MG 24 hr tablet, TAKE 1 TABLET BY MOUTH EVERY DAY, Disp: 90 tablet, Rfl: 1  •  nystatin (MYCOSTATIN) 529361 UNIT/GM cream, Apply  topically to the appropriate area as directed 2 (two) times a day., Disp: 30 g, Rfl: 1  •  pravastatin (PRAVACHOL) 40 MG tablet, Take 1 tablet by mouth Every Night., Disp: 90 tablet, Rfl: 3  •  vitamin D3 125 MCG (5000 UT) capsule capsule, Take 5,000 Units by mouth Daily.,  "Disp: , Rfl:    also entered in Sleep Questionnaire    Patient  has a past medical history of Diabetic peripheral neuropathy (CMS/HCC), DM (diabetes mellitus), type 2 (CMS/HCC), Hyperlipidemia, Matta's neuroma of both feet, Pancreatitis, Peripheral edema, Situational depression, and Urethral granuloma.    Social History:    Social History     Socioeconomic History   • Marital status:    Tobacco Use   • Smoking status: Former Smoker   • Smokeless tobacco: Never Used   Substance and Sexual Activity   • Alcohol use: Yes   • Drug use: Never   • Sexual activity: Defer       Allergies:  Codeine    Vital Signs:    Vitals:    02/22/22 0700   BP: 127/72   Pulse: 62   SpO2: 98%   Weight: 115 kg (254 lb 9.6 oz)   Height: 172.7 cm (68\")     Body mass index is 38.71 kg/m².    REVIEW OF SYSTEMS.  Full review of systems available on the intake form which is scanned in the media tab.  The relevant positive are noted below  1. Daytime excessive sleepiness with [place] Sleepiness Scale :Total score: 3   2. Snoring  3. All negative      Physical exam:  Vitals:    02/22/22 0700   BP: 127/72   Pulse: 62   SpO2: 98%   Weight: 115 kg (254 lb 9.6 oz)   Height: 172.7 cm (68\")    Body mass index is 38.71 kg/m².    HEENT: Head is atraumatic, normocephalic  Eyes: pupils are round equal and reacting to light and accommodation, conjunctiva normal  Nose: no nasal septal defects or deviation and the nasal passages are clear, no nasal polyps,  Throat: tongue normal, oral airway Mallampati class 4  NECK: , trachea is in the midline, thyroid not enlarged  RESPIRATORY SYSTEM: Breath sounds are equal on both sides, there are no wheezes   CARDIOVASULAR SYSTEM: Heart sounds are regular rhythm and thom rate, no edema  EXTREMITES: No cyanosis, clubbing  NEUROLOGICAL SYSTEM: Oriented x 3, no gross motor defects, gait normal    Impression:  1. Obstructive sleep apnea    2. Obesity (BMI 30-39.9)        Obstructive sleep apnea adequately treated with " auto CPAP 6-15 cm H2O with good compliance and usage and no complaints of hypersomnolence.  Patient will talk to DME about trying a new mask.  Return to clinic in 3 months for follow-up or sooner if needed.    Patient uses the CPAP device and benefits from its use in terms of reduction of hypersomnia and snoring.Weight loss will be strongly beneficial to reduce the severity of sleep-disordered breathing.  Caution during activities that require prolonged concentration is strongly advised if sleepiness returns. Changing of PAP supplies regularly is important for effective use. Patient needs to change cushion on the mask or plugs on nasal pillows along with disposable filters once every month and change mask frame, tubing, headgear and Velcro straps every 6 months at the minimum.    Mj Wells MD  Sleep Medicine  02/22/22  08:31 EST

## 2022-03-18 ENCOUNTER — OFFICE VISIT (OUTPATIENT)
Dept: FAMILY MEDICINE CLINIC | Facility: CLINIC | Age: 67
End: 2022-03-18

## 2022-03-18 VITALS — BODY MASS INDEX: 38.49 KG/M2 | HEIGHT: 68 IN | WEIGHT: 254 LBS | HEART RATE: 60 BPM | OXYGEN SATURATION: 99 %

## 2022-03-18 DIAGNOSIS — Z79.899 HIGH RISK MEDICATION USE: ICD-10-CM

## 2022-03-18 DIAGNOSIS — F41.9 ANXIETY AND DEPRESSION: ICD-10-CM

## 2022-03-18 DIAGNOSIS — E11.65 TYPE 2 DIABETES MELLITUS WITH HYPERGLYCEMIA, WITHOUT LONG-TERM CURRENT USE OF INSULIN: Primary | ICD-10-CM

## 2022-03-18 DIAGNOSIS — F32.A ANXIETY AND DEPRESSION: ICD-10-CM

## 2022-03-18 DIAGNOSIS — E78.5 HYPERLIPIDEMIA, UNSPECIFIED HYPERLIPIDEMIA TYPE: ICD-10-CM

## 2022-03-18 PROCEDURE — 99213 OFFICE O/P EST LOW 20 MIN: CPT | Performed by: FAMILY MEDICINE

## 2022-03-18 NOTE — PROGRESS NOTES
HPI  Selene Holley is a 66 y.o. female who is here for follow up of diabetes hyperlipidemia.  Overall no new complaints.  Reports significant stress and is having ear pain.  Does think is gritting teeth a lot.      Review of Systems   HENT: Positive for ear pain.    Psychiatric/Behavioral:        Situational stress   All other systems reviewed and are negative.        Past Medical History:   Diagnosis Date   • Diabetic peripheral neuropathy (HCC)    • DM (diabetes mellitus), type 2 (HCC)    • Hyperlipidemia    • Matta's neuroma of both feet    • Pancreatitis     GALLBLADDER PANCREATITIS   • Peripheral edema    • Situational depression      PROLONGED   • Urethral granuloma        Past Surgical History:   Procedure Laterality Date   • CHOLECYSTECTOMY     • COLONOSCOPY  04/2018    CLEAR/Q10 YEARS   • CYST REMOVAL      LEFT THIGH;41 YEARS AGO   • MYOMECTOMY  02/2002   • TONSILLECTOMY AND ADENOIDECTOMY     • TOTAL ABDOMINAL HYSTERECTOMY  2000     2 UTERINE FIBROIDS   • TOTAL KNEE ARTHROPLASTY Right 2017       Family History   Problem Relation Age of Onset   • Polycystic kidney disease Mother    • Polycystic kidney disease Brother    • Diabetes Brother    • Polycystic kidney disease Brother    • Breast cancer Sister         LATE 50'S   • Diabetes Sister    • Diabetes Maternal Aunt    • Hyperlipidemia Daughter    • Other Daughter         FATTY LIVER        Social History     Socioeconomic History   • Marital status:    Tobacco Use   • Smoking status: Former Smoker   • Smokeless tobacco: Never Used   Substance and Sexual Activity   • Alcohol use: Yes   • Drug use: Never   • Sexual activity: Defer       Vitals:    03/18/22 0744   Pulse: 60   SpO2: 99%        Body mass index is 38.63 kg/m².      Physical Exam  Vitals and nursing note reviewed.   Constitutional:       General: She is not in acute distress.     Appearance: She is well-developed. She is obese.   HENT:      Head: Normocephalic and atraumatic.      Right  Ear: Ear canal normal. A middle ear effusion is present.      Left Ear: Tympanic membrane and ear canal normal.      Ears:        Nose:      Comments: Patient with mask.  Provider with mask and shield  Eyes:      Extraocular Movements: Extraocular movements intact.      Conjunctiva/sclera: Conjunctivae normal.      Pupils: Pupils are equal, round, and reactive to light.   Neck:      Thyroid: No thyromegaly.   Cardiovascular:      Rate and Rhythm: Normal rate and regular rhythm.      Heart sounds: Normal heart sounds.   Pulmonary:      Effort: Pulmonary effort is normal. No respiratory distress.      Breath sounds: Normal breath sounds.   Abdominal:      General: There is no distension.      Palpations: Abdomen is soft. There is no mass.      Tenderness: There is no abdominal tenderness.      Hernia: No hernia is present.   Musculoskeletal:         General: No tenderness or deformity. Normal range of motion.      Cervical back: Normal range of motion.   Lymphadenopathy:      Cervical: No cervical adenopathy.   Skin:     General: Skin is warm and dry.      Coloration: Skin is not pale.      Findings: No rash.   Neurological:      General: No focal deficit present.      Mental Status: She is alert and oriented to person, place, and time.      Motor: No abnormal muscle tone.      Coordination: Coordination normal.   Psychiatric:         Mood and Affect: Mood normal.         Behavior: Behavior normal.         Thought Content: Thought content normal.         Judgment: Judgment normal.           Assessment/Plan    Diagnoses and all orders for this visit:    1. Type 2 diabetes mellitus with hyperglycemia, without long-term current use of insulin ([facility]) (Primary)  -     Lipid Panel  -     Comprehensive Metabolic Panel  -     Hemoglobin A1c  -     Microalbumin / Creatinine Urine Ratio - Urine, Clean Catch    2. Hyperlipidemia, unspecified hyperlipidemia type  -     Lipid Panel    3. Anxiety and depression    4. High risk  medication use  -     CBC & Differential  -     Lipid Panel    Patient here for routine follow-up especially of diabetes.  Only complaint is right ear pain.  Right tympanic membrane does appear somewhat cloudy and some central inflammation.  Discussed use of Flonase but patient declines anything in the nose.  Also given name of ENT specialist to contact if persists.  Does report a lot of stress and grinding of the teeth and strong possibility of TMJ syndrome discussed also.  Will be seeing dentist soon.

## 2022-03-19 LAB
ALBUMIN SERPL-MCNC: 4.1 G/DL (ref 3.8–4.8)
ALBUMIN/CREAT UR: 5 MG/G CREAT (ref 0–29)
ALBUMIN/GLOB SERPL: 2.1 {RATIO} (ref 1.2–2.2)
ALP SERPL-CCNC: 68 IU/L (ref 44–121)
ALT SERPL-CCNC: 17 IU/L (ref 0–32)
AST SERPL-CCNC: 16 IU/L (ref 0–40)
BASOPHILS # BLD AUTO: 0 X10E3/UL (ref 0–0.2)
BASOPHILS NFR BLD AUTO: 1 %
BILIRUB SERPL-MCNC: 0.4 MG/DL (ref 0–1.2)
BUN SERPL-MCNC: 10 MG/DL (ref 8–27)
BUN/CREAT SERPL: 14 (ref 12–28)
CALCIUM SERPL-MCNC: 9.2 MG/DL (ref 8.7–10.3)
CHLORIDE SERPL-SCNC: 101 MMOL/L (ref 96–106)
CHOLEST SERPL-MCNC: 231 MG/DL (ref 100–199)
CO2 SERPL-SCNC: 21 MMOL/L (ref 20–29)
CREAT SERPL-MCNC: 0.73 MG/DL (ref 0.57–1)
CREAT UR-MCNC: 102.4 MG/DL
EGFRCR SERPLBLD CKD-EPI 2021: 91 ML/MIN/1.73
EOSINOPHIL # BLD AUTO: 0.1 X10E3/UL (ref 0–0.4)
EOSINOPHIL NFR BLD AUTO: 3 %
ERYTHROCYTE [DISTWIDTH] IN BLOOD BY AUTOMATED COUNT: 13.1 % (ref 11.7–15.4)
GLOBULIN SER CALC-MCNC: 2 G/DL (ref 1.5–4.5)
GLUCOSE SERPL-MCNC: 168 MG/DL (ref 65–99)
HBA1C MFR BLD: 7.4 % (ref 4.8–5.6)
HCT VFR BLD AUTO: 40.2 % (ref 34–46.6)
HDLC SERPL-MCNC: 53 MG/DL
HGB BLD-MCNC: 13.2 G/DL (ref 11.1–15.9)
IMM GRANULOCYTES # BLD AUTO: 0 X10E3/UL (ref 0–0.1)
IMM GRANULOCYTES NFR BLD AUTO: 0 %
LDLC SERPL CALC-MCNC: 155 MG/DL (ref 0–99)
LYMPHOCYTES # BLD AUTO: 1.3 X10E3/UL (ref 0.7–3.1)
LYMPHOCYTES NFR BLD AUTO: 30 %
MCH RBC QN AUTO: 29.5 PG (ref 26.6–33)
MCHC RBC AUTO-ENTMCNC: 32.8 G/DL (ref 31.5–35.7)
MCV RBC AUTO: 90 FL (ref 79–97)
MICROALBUMIN UR-MCNC: 4.9 UG/ML
MONOCYTES # BLD AUTO: 0.4 X10E3/UL (ref 0.1–0.9)
MONOCYTES NFR BLD AUTO: 9 %
NEUTROPHILS # BLD AUTO: 2.4 X10E3/UL (ref 1.4–7)
NEUTROPHILS NFR BLD AUTO: 57 %
PLATELET # BLD AUTO: 205 X10E3/UL (ref 150–450)
POTASSIUM SERPL-SCNC: 4.5 MMOL/L (ref 3.5–5.2)
PROT SERPL-MCNC: 6.1 G/DL (ref 6–8.5)
RBC # BLD AUTO: 4.48 X10E6/UL (ref 3.77–5.28)
SODIUM SERPL-SCNC: 138 MMOL/L (ref 134–144)
TRIGL SERPL-MCNC: 130 MG/DL (ref 0–149)
VLDLC SERPL CALC-MCNC: 23 MG/DL (ref 5–40)
WBC # BLD AUTO: 4.2 X10E3/UL (ref 3.4–10.8)

## 2022-04-10 DIAGNOSIS — I10 ESSENTIAL HYPERTENSION: ICD-10-CM

## 2022-04-11 RX ORDER — PRAVASTATIN SODIUM 40 MG
TABLET ORAL
Qty: 90 TABLET | Refills: 3 | Status: SHIPPED | OUTPATIENT
Start: 2022-04-11 | End: 2022-10-11

## 2022-04-11 RX ORDER — LISINOPRIL 2.5 MG/1
TABLET ORAL
Qty: 90 TABLET | Refills: 2 | Status: SHIPPED | OUTPATIENT
Start: 2022-04-11

## 2022-04-11 RX ORDER — METFORMIN HYDROCHLORIDE 500 MG/1
TABLET, EXTENDED RELEASE ORAL
Qty: 90 TABLET | Refills: 1 | Status: SHIPPED | OUTPATIENT
Start: 2022-04-11 | End: 2022-10-11 | Stop reason: SDUPTHER

## 2022-05-03 ENCOUNTER — APPOINTMENT (OUTPATIENT)
Dept: SLEEP MEDICINE | Facility: HOSPITAL | Age: 67
End: 2022-05-03

## 2022-07-14 RX ORDER — ESCITALOPRAM OXALATE 20 MG/1
20 TABLET ORAL DAILY
Qty: 90 TABLET | Refills: 1 | OUTPATIENT
Start: 2022-07-14

## 2022-07-14 NOTE — TELEPHONE ENCOUNTER
Caller: Selene Holley    Relationship: Self    Best call back number: [phone] (H)    Requested Prescriptions:   Requested Prescriptions     Pending Prescriptions Disp Refills   • escitalopram (LEXAPRO) 20 MG tablet 90 tablet 1     Sig: Take 1 tablet by mouth Daily.        Pharmacy where request should be sent: [facility]/PHARMACY #[id number] - [place], [place] - [address] AT [address] [place] - [phone] [facility] [phone]      Additional details provided by patient: PATIENT WOULD LIKE TO KNOW IF DR. QUIROS WOULD BE WILLING TO REFILL HER LEXAPRO IT IS NOT DUE TO BE REFILLED JUST YET BUT WOULD BE BEFORE HER NEW PATIENT APPOINTMENT. SHE ALSO HAS BEEN ADDED TO THE WAITLIST FOR THAT APPOINTMENT IN CASE OF ANY CANCELLATIONS. PATIENT SAID IF DR. QUIROS NEEDS TO KNOW EXACTLY HOW MUCH SHE HAS TO LET HER KNOW.     Does the patient have less than a 3 day supply:  [] Yes  [x] No    Ramirez FIGUEREDO   07/14/22 08:41 EDT

## 2022-09-07 RX ORDER — ESCITALOPRAM OXALATE 20 MG/1
20 TABLET ORAL DAILY
Qty: 90 TABLET | Refills: 0 | Status: SHIPPED | OUTPATIENT
Start: 2022-09-07 | End: 2022-12-06

## 2022-09-07 NOTE — TELEPHONE ENCOUNTER
Caller: Selene Holley    Relationship: Self    Best call back number: [phone]    Requested Prescriptions:   Requested Prescriptions     Pending Prescriptions Disp Refills   • escitalopram (LEXAPRO) 20 MG tablet 90 tablet 1     Sig: Take 1 tablet by mouth Daily.        Pharmacy where request should be sent: [facility]/PHARMACY #[id number] - [place], [place] - [address] AT [address] - [phone]  - [phone] [facility]     Additional details provided by patient:1 DAY LEFT.. PATIENT WAS FORMER PATIENT OF DR HERNANDES AND THE NEW OFFICE SHE IS GETTING ESTABLISHED AT TOLD HER TO CALL BACK TO GET A 30 DAY SUPPLY TO LAST UNTIL HER NE APPINTMENT    Does the patient have less than a 3 day supply:  [x] Yes  [] No    Ramirez ROE Rep   09/07/22 15:11 EDT

## 2022-10-10 ENCOUNTER — OFFICE VISIT (OUTPATIENT)
Dept: INTERNAL MEDICINE | Facility: CLINIC | Age: 67
End: 2022-10-10

## 2022-10-10 VITALS
HEIGHT: 68 IN | OXYGEN SATURATION: 96 % | DIASTOLIC BLOOD PRESSURE: 72 MMHG | TEMPERATURE: 98.4 F | SYSTOLIC BLOOD PRESSURE: 120 MMHG | HEART RATE: 66 BPM | WEIGHT: 250 LBS | BODY MASS INDEX: 37.89 KG/M2

## 2022-10-10 DIAGNOSIS — I10 PRIMARY HYPERTENSION: ICD-10-CM

## 2022-10-10 DIAGNOSIS — F41.9 ANXIETY AND DEPRESSION: ICD-10-CM

## 2022-10-10 DIAGNOSIS — E11.65 TYPE 2 DIABETES MELLITUS WITH HYPERGLYCEMIA, WITHOUT LONG-TERM CURRENT USE OF INSULIN: Primary | ICD-10-CM

## 2022-10-10 DIAGNOSIS — E78.2 MIXED HYPERLIPIDEMIA: ICD-10-CM

## 2022-10-10 DIAGNOSIS — G47.33 OSA (OBSTRUCTIVE SLEEP APNEA): ICD-10-CM

## 2022-10-10 DIAGNOSIS — Z79.899 HIGH RISK MEDICATION USE: ICD-10-CM

## 2022-10-10 DIAGNOSIS — R53.83 OTHER FATIGUE: ICD-10-CM

## 2022-10-10 DIAGNOSIS — F32.A ANXIETY AND DEPRESSION: ICD-10-CM

## 2022-10-10 PROCEDURE — 99215 OFFICE O/P EST HI 40 MIN: CPT | Performed by: STUDENT IN AN ORGANIZED HEALTH CARE EDUCATION/TRAINING PROGRAM

## 2022-10-10 NOTE — PROGRESS NOTES
"  Darell Garcia D.O.  Internal Medicine  [facility]  [address], [address]  [place] [address]  [phone]      Chief Complaint  Former patient of Dr. Rajput    SUBJECTIVE    History of Present Illness    Selene Holley is a 67 y.o. female who presents to the office today as a new patient to establish care.   Transferring care from Dr Rajput.    Type 2 diabetes: takes metformin  mg daily; doesn't check blood sugar at home. States her diet is not good right because she is working from home. States she has a stationary bike next to her bed but she cannot even get herself to do that.     HLD: taking pravastatin 40 mg daily for primary prevention    HTN: takes lisinopril 2.5 mg daily, states she thought this was to help her get water off her legs    Anxiety and Depression: takes escitalopram 20 mg daily ; states she feels down most of the time. Feels like she has been on this medicine for years. States she is not motivated. She just wants to stay in the house and wants to take a nap. Feels she has to force herself to go to the grocery. States she tries to meditate. Not in mental health counseling. She has many stresses at home with her  and also at work. Has diazepam 2 mg tablets that she has not had refilled since December 2021 (20 tabs) and does use them infrequently, states she takes 2-3 tablets at a time when she does take them.    MARYANNE: not using CPAP currently but plans to , states she feels better when she does       Allergies   Allergen Reactions   • Codeine Other (See Comments)     \"BAD DREAMS\"        Outpatient Medications Marked as Taking for the 10/10/22 encounter (Office Visit) with Darell Garcia, DO   Medication Sig Dispense Refill   • Denta 5000 Plus 1.1 % cream See Admin Instructions.     • diazePAM (VALIUM) 2 MG tablet TAKE 1-2 TABLETS BY MOUTH DAILY AS NEEDED FOR ANXIETY. 20 tablet 0   • escitalopram (LEXAPRO) 20 MG tablet Take 1 tablet by mouth Daily. 90 tablet " "0   • lisinopril (PRINIVIL,ZESTRIL) 2.5 MG tablet TAKE 1 TABLET BY MOUTH EVERY DAY 90 tablet 2   • metFORMIN ER (GLUCOPHAGE-XR) 500 MG 24 hr tablet TAKE 1 TABLET BY MOUTH EVERY DAY 90 tablet 1   • pravastatin (PRAVACHOL) 40 MG tablet TAKE 1 TABLET BY MOUTH EVERY DAY AT NIGHT 90 tablet 3   • vitamin D3 125 MCG (5000 UT) capsule capsule Take 5,000 Units by mouth Daily.          Past Medical History:   Diagnosis Date   • Anxiety and depression    • Diabetic peripheral neuropathy (HCC)    • DM (diabetes mellitus), type 2 (HCC)    • HTN (hypertension)    • Hyperlipidemia    • Matta's neuroma of both feet    • Pancreatitis     GALLBLADDER PANCREATITIS   • Peripheral edema    • Urethral granuloma      Past Surgical History:   Procedure Laterality Date   • CHOLECYSTECTOMY     • COLONOSCOPY  04/2018    CLEAR/Q10 YEARS   • CYST REMOVAL      LEFT THIGH;41 YEARS AGO   • MOUTH SURGERY      periodontal   • MYOMECTOMY  02/2002   • TONSILLECTOMY AND ADENOIDECTOMY     • TOTAL ABDOMINAL HYSTERECTOMY  2000     2 UTERINE FIBROIDS   • TOTAL KNEE ARTHROPLASTY Right 2017     Family History   Problem Relation Age of Onset   • Polycystic kidney disease Mother    • No Known Problems Father    • Breast cancer Sister         LATE 50'S   • Diabetes Sister    • Polycystic kidney disease Brother    • Diabetes Brother    • Polycystic kidney disease Brother    • Hyperlipidemia Daughter    • Other Daughter         FATTY LIVER    • Diabetes Maternal Aunt     reports that she quit smoking about 7 years ago. Her smoking use included cigarettes. She has a 35.00 pack-year smoking history. She has never used smokeless tobacco. She reports that she does not currently use alcohol. She reports that she does not use drugs.    OBJECTIVE    Vital Signs:   /72   Pulse 66   Temp 98.4 °F (36.9 °C) (Oral)   Ht 172.7 cm (67.99\")   Wt 113 kg (250 lb)   SpO2 96%   BMI 38.02 kg/m²     Physical Exam  Vitals reviewed.   Constitutional:       General: She is " not in acute distress.     Appearance: Normal appearance. She is obese. She is not ill-appearing.   HENT:      Head: Atraumatic.   Eyes:      General: No scleral icterus.  Cardiovascular:      Rate and Rhythm: Normal rate and regular rhythm.      Heart sounds: Normal heart sounds. No murmur heard.  Pulmonary:      Effort: Pulmonary effort is normal. No respiratory distress.      Breath sounds: Normal breath sounds. No wheezing.   Musculoskeletal:      Right lower leg: Edema (trace ankle) present.      Left lower leg: Edema (trace ankle) present.   Skin:     Coloration: Skin is not jaundiced.   Neurological:      Mental Status: She is alert.   Psychiatric:         Mood and Affect: Mood normal.         Behavior: Behavior normal.         Thought Content: Thought content normal.                             ASSESSMENT & PLAN     Diagnoses and all orders for this visit:    1. Type 2 diabetes mellitus with hyperglycemia, without long-term current use of insulin (HCC) (Primary)  -A1c trends on file for this patient:   A1C Last 3 Results    HGBA1C Last 3 Results 3/18/22   Hemoglobin A1C 7.4 (A)   (A) Abnormal value       Comments are available for some flowsheets but are not being displayed.           -Goal A1c for this patient is less than 6.5%  -Current diabetes regimen: metformin  mg daily  -Summary of patient's most recent blood glucose trends at home: doesn't check at home very often  -Changes made to diabetes regimen today: recheck A1c; strongly encouraged increased exercise and better food choices. If A1c is worsening I will increase her metformin dosage. If A1c is stable we can give a few months to focus on dietary changes.   -Microalbuminuria screen: up to date and negative    2. Primary hypertension      -seems to be very mild as she is only on lisinopril 2.5 mg daily      -controlled at 120/72 in office today    3. Other fatigue  4. MARYANNE (obstructive sleep apnea)  -pt states she has been dealing with daytime  fatigue, lack of motivation   -has history of MARYANNE but is not wearing CPAP; also history of depression (see below)  -at this time strongly recommend she get back on her CPAP machine and she agrees that she felt better when she previously used it  -will get TSH, CBC, CMP to rule out other etiologies of daytime fatigue    5. Anxiety and depression  6. High risk medication use  -takes escitalopram 20 mg daily ; states she feels down most of the time. Feels like she has been on this medicine for years. States she is not motivated. She just wants to stay in the house and wants to take a nap. Feels she has to force herself to go to the grocery. States she tries to meditate. Not in mental health counseling. She has many stresses at home with her  and also at work. Has diazepam 2 mg tablets that she has not had refilled since December 2021 (20 tabs) and does use them infrequently, states she takes 2-3 tablets at a time when she does take them.  -PHQ9 of 15 and GAD7 of 8 in office today, overall uncontrolled ; she denies SI in office today but states she has had these thoughts in the past  -will obtain TSH to rule out organic component  -she has untreated CPAP which could certainly be playing into her fatigue symptoms  -encouraged her to seek out mental health counseling, provided list of community providers  -discussed adding Rexulti to her treatment regimen but at this time she wants to focus on getting back on CPAP and perhaps counseling   -will obtain compliance drug screen for PRN benzodiazepine usage  -     TSH Rfx On Abnormal To Free T4  -     Compliance Drug Analysis, Ur - Urine, Clean Catch    7. Mixed hyperlipidemia  - taking pravastatin 40 mg daily for primary prevention  Lab Results   Component Value Date    CHOL 199 11/11/2020    CHLPL 231 (H) 03/18/2022    TRIG 130 03/18/2022    HDL 53 03/18/2022     (H) 03/18/2022     -will repeat fasting lipid today to assess for improvement  -if LDL remains  high, plan to increase pravastatin to 80 mg daily  -     Lipid Panel With LDL/HDL Ratio        I spent 43 minutes caring for Selene on this date of service. This time includes time spent by me in the following activities:preparing for the visit, reviewing tests, performing a medically appropriate examination and/or evaluation , counseling and educating the patient/family/caregiver, ordering medications, tests, or procedures and documenting information in the medical record    The following social determinates of health impact the patient's medical decision making: No social determinates of health were factored in to today's visit.     Follow Up  Return in about 4 weeks (around 11/7/2022) for Annual physical.    Patient/family had no further questions at this time and verbalized understanding of the plan discussed today.

## 2022-10-11 LAB
BASOPHILS # BLD AUTO: 0 X10E3/UL (ref 0–0.2)
BASOPHILS NFR BLD AUTO: 1 %
BUN SERPL-MCNC: 15 MG/DL (ref 8–27)
BUN/CREAT SERPL: 20 (ref 12–28)
CALCIUM SERPL-MCNC: 9.4 MG/DL (ref 8.7–10.3)
CHLORIDE SERPL-SCNC: 106 MMOL/L (ref 96–106)
CHOLEST SERPL-MCNC: 215 MG/DL (ref 100–199)
CO2 SERPL-SCNC: 23 MMOL/L (ref 20–29)
CREAT SERPL-MCNC: 0.75 MG/DL (ref 0.57–1)
EGFRCR SERPLBLD CKD-EPI 2021: 88 ML/MIN/1.73
EOSINOPHIL # BLD AUTO: 0.1 X10E3/UL (ref 0–0.4)
EOSINOPHIL NFR BLD AUTO: 3 %
ERYTHROCYTE [DISTWIDTH] IN BLOOD BY AUTOMATED COUNT: 13.3 % (ref 11.7–15.4)
GLUCOSE SERPL-MCNC: 151 MG/DL (ref 70–99)
HBA1C MFR BLD: 7.3 % (ref 4.8–5.6)
HCT VFR BLD AUTO: 39.8 % (ref 34–46.6)
HDLC SERPL-MCNC: 52 MG/DL
HGB BLD-MCNC: 12.9 G/DL (ref 11.1–15.9)
IMM GRANULOCYTES # BLD AUTO: 0 X10E3/UL (ref 0–0.1)
IMM GRANULOCYTES NFR BLD AUTO: 0 %
LDLC SERPL CALC-MCNC: 131 MG/DL (ref 0–99)
LDLC/HDLC SERPL: 2.5 RATIO (ref 0–3.2)
LYMPHOCYTES # BLD AUTO: 1.7 X10E3/UL (ref 0.7–3.1)
LYMPHOCYTES NFR BLD AUTO: 33 %
MCH RBC QN AUTO: 29.6 PG (ref 26.6–33)
MCHC RBC AUTO-ENTMCNC: 32.4 G/DL (ref 31.5–35.7)
MCV RBC AUTO: 91 FL (ref 79–97)
MONOCYTES # BLD AUTO: 0.6 X10E3/UL (ref 0.1–0.9)
MONOCYTES NFR BLD AUTO: 11 %
NEUTROPHILS # BLD AUTO: 2.6 X10E3/UL (ref 1.4–7)
NEUTROPHILS NFR BLD AUTO: 52 %
PLATELET # BLD AUTO: 201 X10E3/UL (ref 150–450)
POTASSIUM SERPL-SCNC: 4.3 MMOL/L (ref 3.5–5.2)
RBC # BLD AUTO: 4.36 X10E6/UL (ref 3.77–5.28)
SODIUM SERPL-SCNC: 143 MMOL/L (ref 134–144)
TRIGL SERPL-MCNC: 180 MG/DL (ref 0–149)
TSH SERPL DL<=0.005 MIU/L-ACNC: 3.31 UIU/ML (ref 0.45–4.5)
VLDLC SERPL CALC-MCNC: 32 MG/DL (ref 5–40)
WBC # BLD AUTO: 5 X10E3/UL (ref 3.4–10.8)

## 2022-10-11 RX ORDER — PRAVASTATIN SODIUM 80 MG/1
80 TABLET ORAL NIGHTLY
Qty: 90 TABLET | Refills: 1 | Status: SHIPPED | OUTPATIENT
Start: 2022-10-11

## 2022-10-11 RX ORDER — METFORMIN HYDROCHLORIDE 500 MG/1
1000 TABLET, EXTENDED RELEASE ORAL DAILY
Qty: 180 TABLET | Refills: 1 | Status: SHIPPED | OUTPATIENT
Start: 2022-10-11

## 2022-10-17 LAB — DRUGS UR: NORMAL

## 2022-11-03 ENCOUNTER — OFFICE VISIT (OUTPATIENT)
Dept: INTERNAL MEDICINE | Facility: CLINIC | Age: 67
End: 2022-11-03

## 2022-11-03 VITALS
SYSTOLIC BLOOD PRESSURE: 128 MMHG | BODY MASS INDEX: 37.74 KG/M2 | WEIGHT: 249 LBS | HEIGHT: 68 IN | OXYGEN SATURATION: 98 % | TEMPERATURE: 97.4 F | HEART RATE: 75 BPM | DIASTOLIC BLOOD PRESSURE: 82 MMHG

## 2022-11-03 DIAGNOSIS — Z78.0 POSTMENOPAUSAL: ICD-10-CM

## 2022-11-03 DIAGNOSIS — Z00.00 ANNUAL PHYSICAL EXAM: Primary | ICD-10-CM

## 2022-11-03 DIAGNOSIS — Z28.21 INFLUENZA VACCINATION DECLINED: ICD-10-CM

## 2022-11-03 DIAGNOSIS — Z12.2 SCREENING FOR LUNG CANCER: ICD-10-CM

## 2022-11-03 DIAGNOSIS — F41.9 ANXIETY: ICD-10-CM

## 2022-11-03 PROCEDURE — 99397 PER PM REEVAL EST PAT 65+ YR: CPT | Performed by: STUDENT IN AN ORGANIZED HEALTH CARE EDUCATION/TRAINING PROGRAM

## 2022-11-03 RX ORDER — DIAZEPAM 2 MG/1
2-4 TABLET ORAL DAILY PRN
Qty: 20 TABLET | Refills: 0 | Status: SHIPPED | OUTPATIENT
Start: 2022-11-03

## 2022-11-03 NOTE — PROGRESS NOTES
Darell Garcia D.O.  Internal Medicine  [facility]  [address], [address]  [place] [address]  [phone]    Chief Complaint  Annual checkup    HISTORY    Selene Holley is a 67 y.o. female who presents to the office today as a  an established patient for their annual preventative exam.     No hospitalization(s) within the last year.      Current exercise regimen: swimming around 4 days per week for several hours     Status of chronic medical conditions:    Type 2 diabetes: takes metformin ER 1000 mg daily and this was increased from 500 mg daily at last visit; doesn't check blood sugar at home consistently     HLD: taking pravastatin 80 mg daily for primary prevention     HTN: takes lisinopril 2.5 mg daily     Anxiety and Depression: takes escitalopram 20 mg daily ; states she feels down most of the time. Feels that she has to force herself to go out but she is feeling a little better since going back to the pool.  Not in mental health counseling but she has one that she has had success with before and has an appointment in January.  Has diazepam 2 mg tablets that she has not had refilled since December 2021 (20 tabs) and does use them infrequently, states she takes 2-3 tablets at a time when she does take them.    MARYANNE: not using CPAP currently       Patient's overall comments about their health or any other specific health concerns they report: none      Health Maintenance Summary          Ordered - DXA SCAN (Every 2 Years) Ordered on 11/3/2022    No completion history exists for this topic.          Overdue - COVID-19 Vaccine (1) Overdue - never done    No completion history exists for this topic.          Overdue - Pneumococcal Vaccine 65+ (1 - PCV) Overdue - never done    No completion history exists for this topic.          Overdue - ZOSTER VACCINE (1 of 2) Overdue - never done    No completion history exists for this topic.          Ordered - LUNG CANCER SCREENING (Yearly)  Ordered on 11/3/2022    No completion history exists for this topic.          Overdue - DIABETIC FOOT EXAM (Yearly) Overdue since 12/28/2021 12/28/2020  SCANNED - FOOT EXAM          Overdue - DIABETIC EYE EXAM (Yearly) Overdue since 3/23/2022    03/23/2021  Done    02/18/2020  SCANNED - EYE EXAM          Overdue - INFLUENZA VACCINE (Yearly - August to March) Overdue - never done    No completion history exists for this topic.          URINE MICROALBUMIN (Yearly) Next due on 3/18/2023    03/18/2022  Multiple components of Microalbumin / Creatinine Urine Ratio - Urine, Clean Catch    11/11/2020  Multiple components of Microalbumin / Creatinine Urine Ratio - Urine, Clean Catch    09/12/2018  MicroAlbumin, Urine, Random -          HEMOGLOBIN A1C (Every 6 Months) Next due on 4/10/2023    10/10/2022  Hemoglobin A1C component of Hemoglobin A1c    03/18/2022  Hemoglobin A1C component of Hemoglobin A1c    08/20/2021  Hemoglobin A1C component of Hemoglobin A1c    11/11/2020  Hemoglobin A1C component of Hemoglobin A1c    12/24/2019  Hemoglobin A1C component of Hemoglobin A1c    Only the first 5 history entries have been loaded, but more history exists.          LIPID PANEL (Yearly) Next due on 10/10/2023    10/10/2022  Lipid Panel With LDL/HDL Ratio    03/18/2022  Lipid Panel    08/20/2021  Lipid Panel    11/11/2020  Lipid Panel    12/24/2019  Lipid Panel    Only the first 5 history entries have been loaded, but more history exists.          ANNUAL PHYSICAL (Yearly) Next due on 11/4/2023 11/03/2022  Done    08/20/2021  Done          MAMMOGRAM (Every 2 Years) Next due on 2/11/2024 02/11/2022  Mammo Screening Bilateral With CAD    02/11/2022  Mammo Screening Digital Tomosynthesis Bilateral With CAD    01/30/2021  Mammo Screening Digital Tomosynthesis Bilateral With CAD    01/11/2020  Mammo Screening Digital Tomosynthesis Bilateral With CAD    01/11/2020  Mammo Screening Digital Tomosynthesis Bilateral With CAD    Only  "the first 5 history entries have been loaded, but more history exists.          TDAP/TD VACCINES (2 - Td or Tdap) Next due on 4/21/2024 04/21/2014  Imm Admin: Tdap          COLORECTAL CANCER SCREENING (COLONOSCOPY - Every 10 Years) Next due on 4/9/2028 04/09/2018  COLONOSCOPY (Done)    04/09/2018  SCANNED - COLONOSCOPY          HEPATITIS C SCREENING  Completed    08/20/2021  Hep C Virus Ab component of Hepatitis C Antibody          Discontinued - PAP SMEAR  Discontinued    No completion history exists for this topic.                 Allergies   Allergen Reactions   • Codeine Other (See Comments)     \"BAD DREAMS\"        Outpatient Medications Marked as Taking for the 11/3/22 encounter (Office Visit) with Darell Garcia,    Medication Sig Dispense Refill   • Denta 5000 Plus 1.1 % cream See Admin Instructions.     • diazePAM (VALIUM) 2 MG tablet Take 1-2 tablets by mouth Daily As Needed for Anxiety. 20 tablet 0   • escitalopram (LEXAPRO) 20 MG tablet Take 1 tablet by mouth Daily. 90 tablet 0   • lisinopril (PRINIVIL,ZESTRIL) 2.5 MG tablet TAKE 1 TABLET BY MOUTH EVERY DAY 90 tablet 2   • metFORMIN ER (GLUCOPHAGE-XR) 500 MG 24 hr tablet Take 2 tablets by mouth Daily. 180 tablet 1   • pravastatin (PRAVACHOL) 80 MG tablet Take 1 tablet by mouth Every Night. 90 tablet 1   • vitamin D3 125 MCG (5000 UT) capsule capsule Take 5,000 Units by mouth Daily.     • [DISCONTINUED] diazePAM (VALIUM) 2 MG tablet TAKE 1-2 TABLETS BY MOUTH DAILY AS NEEDED FOR ANXIETY. 20 tablet 0        Past Medical History:   Diagnosis Date   • Anxiety and depression    • Diabetic peripheral neuropathy (HCC)    • DM (diabetes mellitus), type 2 (HCC)    • HTN (hypertension)    • Hyperlipidemia    • Matta's neuroma of both feet    • Pancreatitis     GALLBLADDER PANCREATITIS   • Peripheral edema    • Urethral granuloma        Immunization History   Administered Date(s) Administered   • Tdap 04/21/2014        OBJECTIVE    Vital Signs:   /82   " "Pulse 75   Temp 97.4 °F (36.3 °C) (Infrared)   Ht 172.7 cm (67.99\")   Wt 113 kg (249 lb)   SpO2 98%   BMI 37.87 kg/m²     Physical Exam  Vitals reviewed.   Constitutional:       General: She is not in acute distress.     Appearance: Normal appearance. She is obese. She is not ill-appearing.   HENT:      Head: Normocephalic and atraumatic.      Right Ear: Tympanic membrane, ear canal and external ear normal. There is no impacted cerumen.      Left Ear: Tympanic membrane, ear canal and external ear normal. There is no impacted cerumen.      Mouth/Throat:      Mouth: Mucous membranes are moist.      Pharynx: No oropharyngeal exudate or posterior oropharyngeal erythema.   Eyes:      General: No scleral icterus.     Extraocular Movements: Extraocular movements intact.      Conjunctiva/sclera: Conjunctivae normal.      Pupils: Pupils are equal, round, and reactive to light.   Cardiovascular:      Rate and Rhythm: Normal rate and regular rhythm.      Heart sounds: Normal heart sounds. No murmur heard.  Pulmonary:      Effort: Pulmonary effort is normal. No respiratory distress.      Breath sounds: Normal breath sounds. No wheezing.   Abdominal:      General: Bowel sounds are normal. There is no distension.      Palpations: Abdomen is soft.      Tenderness: There is no abdominal tenderness. There is no guarding.   Musculoskeletal:      Cervical back: Neck supple.      Right lower leg: No edema.      Left lower leg: No edema.   Lymphadenopathy:      Cervical: No cervical adenopathy.   Skin:     General: Skin is warm and dry.      Coloration: Skin is not jaundiced.   Neurological:      General: No focal deficit present.      Mental Status: She is alert and oriented to person, place, and time.      Cranial Nerves: No cranial nerve deficit.      Motor: No weakness.   Psychiatric:         Mood and Affect: Mood normal.         Behavior: Behavior normal.         Thought Content: Thought content normal.                     "     ASSESSMENT & PLAN     #Annual Preventative Health Examination   -Age and sex appropriate physical exam performed and documented. Updated past medical, family, social and surgical histories as well as allergies and care team list. Addressed care gaps listed in the medical record.  -Encouraged seat belt use for every car ride for patient and all occupants.  Encouraged sunscreen use to reduce risk of skin cancer for any days with sun exposure over 20 minutes. Discussed the importance of smoke and carbon monoxide detectors in the home.   -Encouraged annual dental and vision exams as part of their overall health.  -Encouraged minimum of 30 minutes or more of exercise at a brisk walk or higher 5 days per week combined with a well-balanced diet.  -Immunizations reviewed and updated in EMR. Recommended influenza , COVID 19 and pneumonia shot and she declined.     The 10-year ASCVD risk score (Justyn BOX, et al., 2019) is: 16.2%    Values used to calculate the score:      Age: 67 years      Sex: Female      Is Non- : No      Diabetic: Yes      Tobacco smoker: No      Systolic Blood Pressure: 128 mmHg      Is BP treated: Yes      HDL Cholesterol: 52 mg/dL      Total Cholesterol: 215 mg/dL   -Lipid screening:   Lipid Panel    Lipid Panel 3/18/22 10/10/22   Total Cholesterol 231 (A) 215 (A)   Triglycerides 130 180 (A)   HDL Cholesterol 53 52   VLDL Cholesterol 23 32   LDL Cholesterol  155 (A) 131 (A)   LDL/HDL Ratio  2.5   (A) Abnormal value       Comments are available for some flowsheets but are not being displayed.          Patient is already on statin therapy.   -Aspirin for primary or secondary prevention: Not applicable, patient is greater than age 60 and risks outweigh benefits for primary prevention.  -Depression and Anxiety screening: Patient has known diagnosis of depression.  -Diabetes screening:  Patient already has a diagnosis of diabetes mellitus and is being treated.   -Tobacco use  screening: Patient denies cigarette use. Tobacco counseling was was not indicated.  -Alcohol use screening: Patient reports drinking 0-2 drinks per week.. Alcohol abuse counseling was was not indicated.  -Illicit drug screening: Patient does not use illicit drugs.  -Hypertension screening: Patient with known diagnosis of hypertension and is receiving treatment.  -HIV screening: Discussed with patient the importance of identifying asymptomatic HIV infection for adults in their age group with a one time screening test .Patient declined screening.   -Syphilis screening: Syphilis screening not indicated.  -Hepatitis B virus screening: Screening not indicated, not in a high-risk group.  -Hepatitis C virus screening:  Patient has already completed Hepatitis C screening. Negative screening on file.   -Colon cancer screening: Patient is already up to date on their colon cancer screening with colonoscopy is indicated again in 2028  -Lung cancer screening: Patient is age 55 or older and meets screening criteria.  Discussed with patient that they are eligible for lung cancer screening via low dose CT Scan of the chest due to their age, number of pack years, and absence of signs or symptoms related to current lung cancer.  The patient was educated on the risks (radiation exposure, false alarm, need for biopsy or additional tests, potential complications associated with additional tests) and benefits of lung cancer screening (early diagnosis, increased chance of survival). Counseled the patient on the importance of adherence to annual lung cancer LDCT screening if they do pursue screening, and that the success of the screenings will be impacted if they are not willing to undergo additional diagnosis and treatment. Counseled on the importance of maintaining cigarette smoking abstinence (if former smoker) or the importance of smoking cessation (if current smoker). Patient accepted low dose CT scan of the chest.  -Cervical cancer  screening:  Informed patient that the USPSTF recommends screening for cervical cancer every 3 years with cervical cytology alone in women aged 21 to 29 years. For women aged 30 to 65 years, the USPSTF recommends screening every 3 years with cervical cytology alone, every 5 years with high-risk human papillomavirus (hrHPV) testing alone, or every 5 years with HPV testing in combination with cytology (cotesting). S/p hysterectomy  -Breast cancer screening:  Was done approximately 2/2022 and the result was: Birads I (Normal)..  -Osteoporosis screening: Informed patient that the USPSTF recommends screening for osteoporosis with bone measurement testing to prevent osteoporotic fractures in women 65 years and older. Last DEXA scan was unknown but she is agreeable to have screening again.     Follow up in 1 year for annual physical exam.    Patient/family had no further questions at this time and verbalized understanding of the plan discussed today.

## 2022-12-05 NOTE — TELEPHONE ENCOUNTER
Rx Refill Note  Requested Prescriptions     Pending Prescriptions Disp Refills   • escitalopram (LEXAPRO) 20 MG tablet [Pharmacy Med Name: ESCITALOPRAM 20 MG TABLET] 90 tablet 0     Sig: TAKE 1 TABLET BY MOUTH EVERY DAY      Last office visit with prescribing clinician: Visit date not found   Last telemedicine visit with prescribing clinician: Visit date not found   Next office visit with prescribing clinician: Visit date not found                         Would you like a call back once the refill request has been completed: [] Yes [] No    If the office needs to give you a call back, can they leave a voicemail: [] Yes [] No    Ab Mcintyre MA  12/05/22, 11:18 EST

## 2022-12-06 RX ORDER — ESCITALOPRAM OXALATE 20 MG/1
TABLET ORAL
Qty: 90 TABLET | Refills: 1 | Status: SHIPPED | OUTPATIENT
Start: 2022-12-06

## 2023-02-28 ENCOUNTER — TELEPHONE (OUTPATIENT)
Dept: INTERNAL MEDICINE | Facility: CLINIC | Age: 68
End: 2023-02-28
Payer: COMMERCIAL

## 2023-02-28 DIAGNOSIS — Z12.31 ENCOUNTER FOR SCREENING MAMMOGRAM FOR MALIGNANT NEOPLASM OF BREAST: Primary | ICD-10-CM

## 2023-04-13 RX ORDER — METFORMIN HYDROCHLORIDE 500 MG/1
1000 TABLET, EXTENDED RELEASE ORAL DAILY
Qty: 180 TABLET | Refills: 1 | Status: SHIPPED | OUTPATIENT
Start: 2023-04-13

## 2023-04-13 RX ORDER — PRAVASTATIN SODIUM 80 MG/1
TABLET ORAL
Qty: 90 TABLET | Refills: 1 | Status: SHIPPED | OUTPATIENT
Start: 2023-04-13

## 2023-04-24 ENCOUNTER — OFFICE VISIT (OUTPATIENT)
Dept: INTERNAL MEDICINE | Facility: CLINIC | Age: 68
End: 2023-04-24
Payer: COMMERCIAL

## 2023-04-24 VITALS
SYSTOLIC BLOOD PRESSURE: 120 MMHG | WEIGHT: 252 LBS | OXYGEN SATURATION: 97 % | HEIGHT: 68 IN | BODY MASS INDEX: 38.19 KG/M2 | HEART RATE: 67 BPM | DIASTOLIC BLOOD PRESSURE: 70 MMHG

## 2023-04-24 DIAGNOSIS — E78.2 MIXED HYPERLIPIDEMIA: ICD-10-CM

## 2023-04-24 DIAGNOSIS — E11.40 TYPE 2 DIABETES MELLITUS WITH DIABETIC NEUROPATHY, WITHOUT LONG-TERM CURRENT USE OF INSULIN: Primary | ICD-10-CM

## 2023-04-24 PROCEDURE — 99214 OFFICE O/P EST MOD 30 MIN: CPT | Performed by: STUDENT IN AN ORGANIZED HEALTH CARE EDUCATION/TRAINING PROGRAM

## 2023-04-24 NOTE — PROGRESS NOTES
"  Darell Garcia D.O.  Internal Medicine  [facility]  [address], [address]  [place] [address]  [phone]      Chief Complaint  Diabetes    SUBJECTIVE    History of Present Illness    Selene Holley is a 68 y.o. female who presents to the office today as an established patient that last saw me on 11/3/2022.     Type 2 diabetes: takes metformin ER 1000 mg daily doesn't check blood sugar at home. States her diet is \"OK\", she is working from home and she snacks a lot at home because \"it's there\". She plans to start swimming again. She eats no pasta and minimal bread.      HLD: taking pravastatin 80 mg daily for primary prevention, states she does not eat fried foods or gravy. She eats turkey mcgarry.        Allergies   Allergen Reactions   • Codeine Other (See Comments)     \"BAD DREAMS\"        Outpatient Medications Marked as Taking for the 4/24/23 encounter (Office Visit) with Darell Garcia, DO   Medication Sig Dispense Refill   • Denta 5000 Plus 1.1 % cream See Admin Instructions.     • diazePAM (VALIUM) 2 MG tablet Take 1-2 tablets by mouth Daily As Needed for Anxiety. 20 tablet 0   • escitalopram (LEXAPRO) 20 MG tablet TAKE 1 TABLET BY MOUTH EVERY DAY 90 tablet 1   • lisinopril (PRINIVIL,ZESTRIL) 2.5 MG tablet TAKE 1 TABLET BY MOUTH EVERY DAY 90 tablet 2   • metFORMIN ER (GLUCOPHAGE-XR) 500 MG 24 hr tablet TAKE 2 TABLETS BY MOUTH DAILY 180 tablet 1   • pravastatin (PRAVACHOL) 80 MG tablet TAKE 1 TABLET BY MOUTH EVERY DAY AT NIGHT 90 tablet 1   • vitamin D3 125 MCG (5000 UT) capsule capsule Take 1 capsule by mouth Daily.          Past Medical History:   Diagnosis Date   • Anxiety and depression    • Diabetic peripheral neuropathy    • DM (diabetes mellitus), type 2    • HTN (hypertension)    • Hyperlipidemia    • Matta's neuroma of both feet    • Pancreatitis     GALLBLADDER PANCREATITIS   • Peripheral edema    • Urethral granuloma        OBJECTIVE    Vital Signs:   /70   Pulse 67  " " Ht 172.7 cm (67.99\")   Wt 114 kg (252 lb)   SpO2 97%   BMI 38.33 kg/m²     Physical Exam  Vitals reviewed.   Constitutional:       General: She is not in acute distress.     Appearance: Normal appearance. She is obese. She is not ill-appearing.   HENT:      Head: Normocephalic and atraumatic.   Eyes:      General: No scleral icterus.  Cardiovascular:      Rate and Rhythm: Normal rate and regular rhythm.      Pulses:           Dorsalis pedis pulses are 1+ on the right side and 1+ on the left side.        Posterior tibial pulses are 1+ on the right side and 1+ on the left side.      Heart sounds: Normal heart sounds. No murmur heard.  Pulmonary:      Effort: Pulmonary effort is normal. No respiratory distress.      Breath sounds: Normal breath sounds. No wheezing or rhonchi.   Musculoskeletal:      Right lower leg: No edema.      Left lower leg: No edema.      Right foot: Normal range of motion. No deformity or bunion.      Left foot: Normal range of motion. No deformity or bunion.   Feet:      Right foot:      Protective Sensation: 7 sites tested. 7 sites sensed.      Skin integrity: Skin integrity normal.      Toenail Condition: Right toenails are normal.      Left foot:      Protective Sensation: 7 sites tested. 6 sites sensed.      Skin integrity: Skin integrity normal.      Toenail Condition: Left toenails are normal.   Skin:     Coloration: Skin is not jaundiced.   Neurological:      Mental Status: She is alert.   Psychiatric:         Mood and Affect: Mood normal.         Behavior: Behavior normal.         Thought Content: Thought content normal.                             ASSESSMENT & PLAN     Diagnoses and all orders for this visit:    1. Type 2 diabetes mellitus with diabetic neuropathy, without long-term current use of insulin (Primary)  -A1c trends on file for this patient:   A1C Last 3 Results        10/10/2022    08:10   HGBA1C Last 3 Results   Hemoglobin A1C 7.3       -Goal A1c for this patient is " "less than 6.5%  -Current diabetes regimen: takes metformin ER 1000 mg daily doesn't check blood sugar at home. States her diet is \"OK\", she is working from home and she snacks a lot at home because \"it's there\". She plans to start swimming again. She eats no pasta and minimal bread.   -Changes made to diabetes regimen today: recheck a1c today; stressed importance of meeting her A1c goal in order to limit progression of her diabetic neuropathy. Stressed importance of annual diabetic foot exams and she states she has a podiatrist she can follow up with. Will attempt to obtain her most recent diabetic eye exam report. Recommended she try [facility] behavioral obesity treatment which might help some with her food triggers.   -Microalbuminuria screen: update today  -Discussed with patient the importance of daily self foot care by visualizing both feet including the soles and between the toes; keep feet dry by regularly changing socks and shoes and drying feet thoroughly after baths and exercise; report any new lesions/discolorations/or swelling; do not walk barefoot, even while indoors        -check renal function and electrolytes    2. Mixed hyperlipidemia  -taking pravastatin 80 mg daily for primary prevention, states she does not eat fried foods or gravy. She eats turkey mcgarry.   Lab Results   Component Value Date    CHOL 199 11/11/2020    CHLPL 215 (H) 10/10/2022    TRIG 180 (H) 10/10/2022    HDL 52 10/10/2022     (H) 10/10/2022     -recheck fasting lipid profile today to assess for improvement with increased pravastatin dosage   -     Lipid Panel With LDL/HDL Ratio            The following social determinates of health impact the patient's medical decision making: No social determinates of health were factored in to today's visit.     Follow Up  Return in about 3 months (around 7/24/2023).    Patient/family had no further questions at this time and verbalized understanding of the plan discussed today.   "

## 2023-04-25 LAB
ALBUMIN SERPL-MCNC: 4.1 G/DL (ref 3.5–5.2)
ALBUMIN/CREAT UR: 6 MG/G CREAT (ref 0–29)
ALBUMIN/GLOB SERPL: 2.2 G/DL
ALP SERPL-CCNC: 64 U/L (ref 39–117)
ALT SERPL-CCNC: 16 U/L (ref 1–33)
AST SERPL-CCNC: 14 U/L (ref 1–32)
BILIRUB SERPL-MCNC: 0.4 MG/DL (ref 0–1.2)
BUN SERPL-MCNC: 9 MG/DL (ref 8–23)
BUN/CREAT SERPL: 12.3 (ref 7–25)
CALCIUM SERPL-MCNC: 9.4 MG/DL (ref 8.6–10.5)
CHLORIDE SERPL-SCNC: 105 MMOL/L (ref 98–107)
CHOLEST SERPL-MCNC: 197 MG/DL (ref 0–200)
CO2 SERPL-SCNC: 26.8 MMOL/L (ref 22–29)
CREAT SERPL-MCNC: 0.73 MG/DL (ref 0.57–1)
CREAT UR-MCNC: 211.5 MG/DL
EGFRCR SERPLBLD CKD-EPI 2021: 90.3 ML/MIN/1.73
GLOBULIN SER CALC-MCNC: 1.9 GM/DL
GLUCOSE SERPL-MCNC: 167 MG/DL (ref 65–99)
HBA1C MFR BLD: 6.8 % (ref 4.8–5.6)
HDLC SERPL-MCNC: 58 MG/DL (ref 40–60)
LDLC SERPL CALC-MCNC: 112 MG/DL (ref 0–100)
LDLC/HDLC SERPL: 1.86 {RATIO}
MICROALBUMIN UR-MCNC: 12.1 UG/ML
POTASSIUM SERPL-SCNC: 4.7 MMOL/L (ref 3.5–5.2)
PROT SERPL-MCNC: 6 G/DL (ref 6–8.5)
SODIUM SERPL-SCNC: 139 MMOL/L (ref 136–145)
TRIGL SERPL-MCNC: 157 MG/DL (ref 0–150)
VLDLC SERPL CALC-MCNC: 27 MG/DL (ref 5–40)

## 2023-05-22 ENCOUNTER — HOSPITAL ENCOUNTER (OUTPATIENT)
Dept: BONE DENSITY | Facility: HOSPITAL | Age: 68
Discharge: HOME | End: 2023-05-22
Payer: COMMERCIAL

## 2023-05-22 ENCOUNTER — HOSPITAL ENCOUNTER (OUTPATIENT)
Dept: CT IMAGING | Facility: HOSPITAL | Age: 68
Discharge: HOME | End: 2023-05-22
Payer: COMMERCIAL

## 2023-05-22 DIAGNOSIS — Z78.0 POSTMENOPAUSAL: ICD-10-CM

## 2023-05-22 PROCEDURE — 71271 CT THORAX LUNG CANCER SCR C-: CPT

## 2023-05-22 PROCEDURE — 77080 DXA BONE DENSITY AXIAL: CPT

## 2023-05-25 ENCOUNTER — TELEPHONE (OUTPATIENT)
Dept: GASTROENTEROLOGY | Facility: CLINIC | Age: 68
End: 2023-05-25
Payer: COMMERCIAL

## 2023-05-25 DIAGNOSIS — K22.89 ESOPHAGEAL THICKENING: Primary | ICD-10-CM

## 2023-07-24 RX ORDER — ESCITALOPRAM OXALATE 20 MG/1
TABLET ORAL
Qty: 90 TABLET | Refills: 1 | OUTPATIENT
Start: 2023-07-24

## 2023-07-27 ENCOUNTER — OFFICE VISIT (OUTPATIENT)
Dept: INTERNAL MEDICINE | Facility: CLINIC | Age: 68
End: 2023-07-27
Payer: COMMERCIAL

## 2023-07-27 VITALS
OXYGEN SATURATION: 96 % | HEART RATE: 54 BPM | DIASTOLIC BLOOD PRESSURE: 80 MMHG | SYSTOLIC BLOOD PRESSURE: 142 MMHG | HEIGHT: 68 IN | WEIGHT: 239 LBS | BODY MASS INDEX: 36.22 KG/M2

## 2023-07-27 DIAGNOSIS — I25.84 CORONARY ARTERY CALCIFICATION: ICD-10-CM

## 2023-07-27 DIAGNOSIS — I25.10 CORONARY ARTERY CALCIFICATION: ICD-10-CM

## 2023-07-27 DIAGNOSIS — F41.9 ANXIETY: ICD-10-CM

## 2023-07-27 DIAGNOSIS — I10 ESSENTIAL HYPERTENSION: ICD-10-CM

## 2023-07-27 DIAGNOSIS — E78.2 MIXED HYPERLIPIDEMIA: ICD-10-CM

## 2023-07-27 DIAGNOSIS — E11.40 TYPE 2 DIABETES MELLITUS WITH DIABETIC NEUROPATHY, WITHOUT LONG-TERM CURRENT USE OF INSULIN: Primary | ICD-10-CM

## 2023-07-27 PROCEDURE — 99214 OFFICE O/P EST MOD 30 MIN: CPT | Performed by: STUDENT IN AN ORGANIZED HEALTH CARE EDUCATION/TRAINING PROGRAM

## 2023-07-27 RX ORDER — ASPIRIN 81 MG/1
81 TABLET ORAL DAILY
COMMUNITY

## 2023-07-27 RX ORDER — LISINOPRIL 2.5 MG/1
2.5 TABLET ORAL DAILY
Qty: 90 TABLET | Refills: 1 | Status: SHIPPED | OUTPATIENT
Start: 2023-07-27

## 2023-07-27 RX ORDER — DIAZEPAM 2 MG/1
2-4 TABLET ORAL DAILY PRN
Qty: 20 TABLET | Refills: 0 | Status: SHIPPED | OUTPATIENT
Start: 2023-07-27

## 2023-07-27 RX ORDER — ATORVASTATIN CALCIUM 40 MG/1
40 TABLET, FILM COATED ORAL DAILY
Qty: 90 TABLET | Refills: 1 | Status: SHIPPED | OUTPATIENT
Start: 2023-07-27

## 2023-07-27 RX ORDER — ESCITALOPRAM OXALATE 20 MG/1
20 TABLET ORAL DAILY
Qty: 90 TABLET | Refills: 1 | Status: SHIPPED | OUTPATIENT
Start: 2023-07-27

## 2023-07-27 NOTE — PROGRESS NOTES
"  Darell Garcia D.O.  Internal Medicine  [facility]  [address], [address]  [place] [address]  [phone]      Chief Complaint  Hyperlipidemia and Diabetes    SUBJECTIVE    History of Present Illness    Selene Holley is a 68 y.o. female who presents to the office today as an established patient that last saw me on 4/24/2023.     Type 2 diabetes: takes metformin ER 1000 mg daily. She doesn't check blood sugar at home. She has really stepped up diet over the last 6 weeks. No longer eating breads. Doesn't eat much sweet food. Doesn't eat pasta really. Has stopped eating before bed. She is eating more wraps and vegetables. For exercise she is swimming 3-4 times per week for 1.5 hours.     HTN: prescribed lisinopril 2.5 mg daily but states she has not been taking this for a few months . Doesn't check BP at home.     HLD/ Coronary artery calcification: currently taking pravastatin 80 mg daily and 81 mg daily     Allergies   Allergen Reactions    Codeine Other (See Comments)     \"BAD DREAMS\"        Outpatient Medications Marked as Taking for the 7/27/23 encounter (Office Visit) with Darell Garcia, DO   Medication Sig Dispense Refill    aspirin 81 MG EC tablet Take 1 tablet by mouth Daily.      diazePAM (VALIUM) 2 MG tablet Take 1-2 tablets by mouth Daily As Needed for Anxiety. 20 tablet 0    escitalopram (LEXAPRO) 20 MG tablet Take 1 tablet by mouth Daily. 90 tablet 1    lisinopril (PRINIVIL,ZESTRIL) 2.5 MG tablet Take 1 tablet by mouth Daily. 90 tablet 1    metFORMIN ER (GLUCOPHAGE-XR) 500 MG 24 hr tablet TAKE 2 TABLETS BY MOUTH DAILY 180 tablet 1    vitamin D3 125 MCG (5000 UT) capsule capsule Take 1 capsule by mouth Daily.      [DISCONTINUED] diazePAM (VALIUM) 2 MG tablet Take 1-2 tablets by mouth Daily As Needed for Anxiety. 20 tablet 0    [DISCONTINUED] pravastatin (PRAVACHOL) 80 MG tablet TAKE 1 TABLET BY MOUTH EVERY DAY AT NIGHT 90 tablet 1        Past Medical History:   Diagnosis Date " "   Anxiety and depression     Coronary artery calcification     Diabetic peripheral neuropathy     DM (diabetes mellitus), type 2     HTN (hypertension)     Hyperlipidemia     Matta's neuroma of both feet     Pancreatitis     GALLBLADDER PANCREATITIS    Peripheral edema     Urethral granuloma        OBJECTIVE    Vital Signs:   /80   Pulse 54   Ht 172.7 cm (68\")   Wt 108 kg (239 lb)   SpO2 96%   BMI 36.34 kg/m²     Physical Exam  Vitals reviewed.   Constitutional:       General: She is not in acute distress.     Appearance: She is obese. She is not ill-appearing.   HENT:      Head: Atraumatic.   Eyes:      General: No scleral icterus.  Cardiovascular:      Rate and Rhythm: Normal rate and regular rhythm.      Heart sounds: Normal heart sounds. No murmur heard.  Pulmonary:      Effort: Pulmonary effort is normal. No respiratory distress.      Breath sounds: Rhonchi (R apex, slight) present. No wheezing.   Musculoskeletal:      Right lower leg: No edema.      Left lower leg: No edema.   Skin:     Coloration: Skin is not jaundiced.   Neurological:      Mental Status: She is alert.   Psychiatric:         Mood and Affect: Mood normal.         Behavior: Behavior normal.         Thought Content: Thought content normal.                           ASSESSMENT & PLAN     Diagnoses and all orders for this visit:    1. Type 2 diabetes mellitus with diabetic neuropathy, without long-term current use of insulin (Primary)  -A1c trends on file for this patient:   A1C Last 3 Results          10/10/2022    08:10 4/24/2023    09:15   HGBA1C Last 3 Results   Hemoglobin A1C 7.3  6.80      -Goal A1c for this patient is less than 6.5%  -Current diabetes regimen: takes metformin ER 1000 mg daily. She doesn't check blood sugar at home. She has really stepped up diet over the last 6 weeks. No longer eating breads. Doesn't eat much sweet food. Doesn't eat pasta really. Has stopped eating before bed. She is eating more wraps and " vegetables. For exercise she is swimming 3-4 times per week for 1.5 hours.         -Changes made to diabetes regimen today: encouraged continued lifestyle modifications; she has lost 13 lbs approximately over the last several months. Recheck A1c today. Can always consider GLP1RA if needed.   -Microalbuminuria screen: up to date    2. Mixed hyperlipidemia  3. Coronary artery calcification  -currently taking pravastatin 80 mg daily and aspirin 81 mg daily   Lab Results   Component Value Date    CHOL 199 11/11/2020    CHLPL 197 04/24/2023    TRIG 157 (H) 04/24/2023    HDL 58 04/24/2023     (H) 04/24/2023     -last lipid with uncontrolled LDL , goal LDL less than 70  -will increase statin intensity by switching to atorvastatin 40 mg daily today; recheck fasting lipid in 3 mo  -     atorvastatin (Lipitor) 40 MG tablet; Take 1 tablet by mouth Daily.  Dispense: 90 tablet; Refill: 1    4. Essential hypertension  -will restart lisinopril 2.5 mg daily, may be able to come off it completely in the future if she continues to have weight loss   -     lisinopril (PRINIVIL,ZESTRIL) 2.5 MG tablet; Take 1 tablet by mouth Daily.  Dispense: 90 tablet; Refill: 1    5. Anxiety  -continue as needed diazepam and escitalopram 20 mg daily  -minimal use of diazepam, 20 tabs lasted from Nov 2022 to present  -SHAHEEN reviewed and appropriate; UDS up to date and appropriate  -     diazePAM (VALIUM) 2 MG tablet; Take 1-2 tablets by mouth Daily As Needed for Anxiety.  Dispense: 20 tablet; Refill: 0  -     escitalopram (LEXAPRO) 20 MG tablet; Take 1 tablet by mouth Daily.  Dispense: 90 tablet; Refill: 1            The following social determinates of health impact the patient's medical decision making: No social determinates of health were factored in to today's visit.     Follow Up  Return in about 4 months (around 11/27/2023) for Annual physical.    Patient/family had no further questions at this time and verbalized understanding of the  plan discussed today.

## 2023-07-28 LAB — HBA1C MFR BLD: 6.6 % (ref 4.8–5.6)

## 2023-08-07 ENCOUNTER — OFFICE VISIT (OUTPATIENT)
Dept: GASTROENTEROLOGY | Facility: CLINIC | Age: 68
End: 2023-08-07
Payer: COMMERCIAL

## 2023-08-07 ENCOUNTER — TELEPHONE (OUTPATIENT)
Dept: GASTROENTEROLOGY | Facility: CLINIC | Age: 68
End: 2023-08-07
Payer: COMMERCIAL

## 2023-08-07 VITALS
TEMPERATURE: 98.6 F | HEART RATE: 55 BPM | HEIGHT: 68 IN | BODY MASS INDEX: 36.68 KG/M2 | OXYGEN SATURATION: 97 % | DIASTOLIC BLOOD PRESSURE: 69 MMHG | WEIGHT: 242 LBS | SYSTOLIC BLOOD PRESSURE: 112 MMHG

## 2023-08-07 DIAGNOSIS — R13.10 DYSPHAGIA, UNSPECIFIED TYPE: ICD-10-CM

## 2023-08-07 DIAGNOSIS — R93.3 ABNORMAL CT SCAN, ESOPHAGUS: Primary | ICD-10-CM

## 2023-08-07 PROCEDURE — 99204 OFFICE O/P NEW MOD 45 MIN: CPT | Performed by: NURSE PRACTITIONER

## 2023-08-07 NOTE — TELEPHONE ENCOUNTER
OK FOR HUB TO READ    PT IS KEVIN FOR EGD ON 9/8/23   PT WAS GIVEN INSTRUCTIONS AND KEVIN YUNG/ EKATERINA

## 2023-08-07 NOTE — PROGRESS NOTES
"Chief Complaint   Patient presents with    Abnormal imaging of the esophagus       HPI    Selene Holley is a  68 y.o. female here to establish care as a new patient for abnormal imaging of the esophagus.    This patient will also follow with Dr. Bustillo.    Past medical history of diabetes, CAD, anxiety, depression, hypertension, hyperlipidemia as well as lap gloria.    Reviewed CT of the chest dated 5/22/2023 with findings of mild esophageal wall thickening.    The patient reports very rare episodes of dysphagia generally elicited by eating too quickly.  Outside of these episodes she feels fine.  Episodes occur about once a month.  She denies dyspepsia, nausea, vomiting, odynophagia, poor appetite or weight loss.  Her appetite is excellent.    No lower GI complaints.    Her last colonoscopy was 2018 (Dr. Salvador) with normal findings.  Recall 10 years.    She denies family history of colon cancer.    Past Medical History:   Diagnosis Date    Anxiety and depression     Coronary artery calcification     Diabetic peripheral neuropathy     DM (diabetes mellitus), type 2     HTN (hypertension)     Hyperlipidemia     Matta's neuroma of both feet     Pancreatitis     GALLBLADDER PANCREATITIS    Peripheral edema     Urethral granuloma        Past Surgical History:   Procedure Laterality Date    CHOLECYSTECTOMY      COLONOSCOPY  04/2018    CLEAR/Q10 YEARS    CYST REMOVAL      LEFT THIGH;41 YEARS AGO    MOUTH SURGERY      periodontal    MYOMECTOMY  02/2002    TONSILLECTOMY AND ADENOIDECTOMY      TOTAL ABDOMINAL HYSTERECTOMY  2000     2 UTERINE FIBROIDS    TOTAL KNEE ARTHROPLASTY Right 2017       Scheduled Meds:     Continuous Infusions: No current facility-administered medications for this visit.      PRN Meds:     Allergies   Allergen Reactions    Codeine Other (See Comments)     \"BAD DREAMS\"       Social History     Socioeconomic History    Marital status:    Tobacco Use    Smoking status: Former     Packs/day: 1.00    "  Years: 35.00     Pack years: 35.00     Types: Cigarettes     Quit date:      Years since quittin.6    Smokeless tobacco: Never   Substance and Sexual Activity    Alcohol use: Not Currently     Alcohol/week: 0.0 - 2.0 standard drinks    Drug use: Never       Family History   Problem Relation Age of Onset    Polycystic kidney disease Mother     No Known Problems Father     Breast cancer Sister         LATE 50'S    Diabetes Sister     Polycystic kidney disease Brother     Diabetes Brother     Polycystic kidney disease Brother     Hyperlipidemia Daughter     Other Daughter         FATTY LIVER     Diabetes Maternal Aunt        Review of Systems   Constitutional:  Negative for appetite change and unexpected weight change.   HENT:  Positive for trouble swallowing.    Gastrointestinal: Negative.      Vitals:    23 0823   BP: 112/69   Pulse: 55   Temp: 98.6 øF (37 øC)   SpO2: 97%       Physical Exam  Constitutional:       Appearance: She is well-developed.   Abdominal:      General: Bowel sounds are normal. There is no distension.      Palpations: Abdomen is soft. There is no mass.      Tenderness: There is no abdominal tenderness. There is no guarding.      Hernia: No hernia is present.   Skin:     General: Skin is warm and dry.      Capillary Refill: Capillary refill takes less than 2 seconds.   Neurological:      Mental Status: She is alert and oriented to person, place, and time.   Psychiatric:         Behavior: Behavior normal.     Assessment    Diagnoses and all orders for this visit:    1. Abnormal CT scan, esophagus (Primary)  -     Case Request; Standing  -     Obtain Informed Consent; Standing  -     Case Request    2. Dysphagia, unspecified type  -     Case Request; Standing  -     Obtain Informed Consent; Standing  -     Case Request     Plan    Recommend EGD with Dr. Bustillo for further evaluation of abnormalities of the esophagus on CT and rare episodes of dysphagia  Risk/benefits of procedure  reviewed the patient all questions were answered  Patient would like to await endoscopic findings before we move to PPI therapy  Follow-up and further recommendations pending aforementioned workup         VALERIO Perez  [facility]  [address] [place] [address]  Office: [phone]

## 2023-08-28 ENCOUNTER — TELEPHONE (OUTPATIENT)
Dept: INTERNAL MEDICINE | Facility: CLINIC | Age: 68
End: 2023-08-28
Payer: COMMERCIAL

## 2023-08-28 NOTE — TELEPHONE ENCOUNTER
Caller: Selene Holley    Relationship: Self    Best call back number: [phone]     What was the call regarding: PATIENT STATES WHEN SHE BUMPS INTO SOMETHING, SHE GETS RED STREAKS ON HER ARM INSTEAD OF BRUISING. IS THIS SOMETHING SHE SHOULD BE CONCERNED ABOUT?    PATIENT WANTS TO GET A TATTOO AND IS WONDERING IF SHE SHOULDN'T DUE TO THIS ISSUE. PLEASE ADVISE.

## 2023-09-01 ENCOUNTER — TELEPHONE (OUTPATIENT)
Dept: GASTROENTEROLOGY | Facility: CLINIC | Age: 68
End: 2023-09-01
Payer: COMMERCIAL

## 2023-09-01 NOTE — TELEPHONE ENCOUNTER
Hub staff attempted to follow warm transfer process and was unsuccessful     Caller: Selene Holley    Relationship to patient: Self    Best call back number: [phone]     Patient is needing: PATIENT IS SCHEDULED FOR A SCOPE ON 9/8/23 AND HAS QUESTIONS REGARDING THE PROCEDURE.  PLEASE CALL BACK TO ADVISE.

## 2023-09-08 ENCOUNTER — ANESTHESIA EVENT (OUTPATIENT)
Dept: GASTROENTEROLOGY | Facility: HOSPITAL | Age: 68
End: 2023-09-08
Payer: COMMERCIAL

## 2023-09-08 ENCOUNTER — ANESTHESIA (OUTPATIENT)
Dept: GASTROENTEROLOGY | Facility: HOSPITAL | Age: 68
End: 2023-09-08
Payer: COMMERCIAL

## 2023-09-08 ENCOUNTER — HOSPITAL ENCOUNTER (OUTPATIENT)
Facility: HOSPITAL | Age: 68
Setting detail: HOSPITAL OUTPATIENT SURGERY
Discharge: HOME | End: 2023-09-08
Attending: INTERNAL MEDICINE | Admitting: INTERNAL MEDICINE
Payer: COMMERCIAL

## 2023-09-08 VITALS
SYSTOLIC BLOOD PRESSURE: 115 MMHG | BODY MASS INDEX: 36.05 KG/M2 | WEIGHT: 237.1 LBS | OXYGEN SATURATION: 95 % | RESPIRATION RATE: 20 BRPM | DIASTOLIC BLOOD PRESSURE: 77 MMHG | HEART RATE: 49 BPM

## 2023-09-08 DIAGNOSIS — R93.3 ABNORMAL CT SCAN, ESOPHAGUS: ICD-10-CM

## 2023-09-08 LAB — GLUCOSE BLDC GLUCOMTR-MCNC: 140 MG/DL (ref 70–130)

## 2023-09-08 PROCEDURE — 25010000002 PROPOFOL 10 MG/ML EMULSION: Performed by: NURSE ANESTHETIST, CERTIFIED REGISTERED

## 2023-09-08 PROCEDURE — 88305 TISSUE EXAM BY PATHOLOGIST: CPT | Performed by: INTERNAL MEDICINE

## 2023-09-08 PROCEDURE — 43239 EGD BIOPSY SINGLE/MULTIPLE: CPT | Performed by: INTERNAL MEDICINE

## 2023-09-08 PROCEDURE — 82948 REAGENT STRIP/BLOOD GLUCOSE: CPT

## 2023-09-08 PROCEDURE — 88312 SPECIAL STAINS GROUP 1: CPT | Performed by: INTERNAL MEDICINE

## 2023-09-08 PROCEDURE — S0260 H&P FOR SURGERY: HCPCS | Performed by: INTERNAL MEDICINE

## 2023-09-08 RX ORDER — PANTOPRAZOLE SODIUM 40 MG/1
40 TABLET, DELAYED RELEASE ORAL DAILY
Qty: 30 TABLET | Refills: 12 | Status: SHIPPED | OUTPATIENT
Start: 2023-09-08

## 2023-09-08 RX ORDER — SODIUM CHLORIDE, SODIUM LACTATE, POTASSIUM CHLORIDE, CALCIUM CHLORIDE 600; 310; 30; 20 MG/100ML; MG/100ML; MG/100ML; MG/100ML
30 INJECTION, SOLUTION INTRAVENOUS CONTINUOUS PRN
Status: DISCONTINUED | OUTPATIENT
Start: 2023-09-08 | End: 2023-09-08 | Stop reason: HOSPADM

## 2023-09-08 RX ORDER — PROPOFOL 10 MG/ML
VIAL (ML) INTRAVENOUS CONTINUOUS PRN
Status: DISCONTINUED | OUTPATIENT
Start: 2023-09-08 | End: 2023-09-08 | Stop reason: SURG

## 2023-09-08 RX ORDER — GLYCOPYRROLATE 0.2 MG/ML
INJECTION INTRAMUSCULAR; INTRAVENOUS AS NEEDED
Status: DISCONTINUED | OUTPATIENT
Start: 2023-09-08 | End: 2023-09-08 | Stop reason: SURG

## 2023-09-08 RX ORDER — PROPOFOL 10 MG/ML
VIAL (ML) INTRAVENOUS AS NEEDED
Status: DISCONTINUED | OUTPATIENT
Start: 2023-09-08 | End: 2023-09-08 | Stop reason: SURG

## 2023-09-08 RX ORDER — LIDOCAINE HYDROCHLORIDE 20 MG/ML
INJECTION, SOLUTION INFILTRATION; PERINEURAL AS NEEDED
Status: DISCONTINUED | OUTPATIENT
Start: 2023-09-08 | End: 2023-09-08 | Stop reason: SURG

## 2023-09-08 RX ADMIN — Medication 200 MCG/KG/MIN: at 10:30

## 2023-09-08 RX ADMIN — GLYCOPYRROLATE 0.2 MG: 0.2 INJECTION INTRAMUSCULAR; INTRAVENOUS at 10:28

## 2023-09-08 RX ADMIN — PROPOFOL 150 MG: 10 INJECTION, EMULSION INTRAVENOUS at 10:28

## 2023-09-08 RX ADMIN — SODIUM CHLORIDE, POTASSIUM CHLORIDE, SODIUM LACTATE AND CALCIUM CHLORIDE 30 ML/HR: 600; 310; 30; 20 INJECTION, SOLUTION INTRAVENOUS at 10:20

## 2023-09-08 RX ADMIN — LIDOCAINE HYDROCHLORIDE 80 MG: 20 INJECTION, SOLUTION INFILTRATION; PERINEURAL at 10:28

## 2023-09-08 NOTE — ANESTHESIA POSTPROCEDURE EVALUATION
Patient: Selene Holley    Procedure Summary       Date: 09/08/23 Room / Location: [facility] ENDOSCOPY 8 / [facility] ENDOSCOPY    Anesthesia Start: 1025 Anesthesia Stop: 1041    Procedure: ESOPHAGOGASTRODUODENOSCOPY with biopsy (Esophagus) Diagnosis:       Abnormal CT scan, esophagus      (Abnormal CT scan, esophagus [R93.3])    Surgeons: Piotr Bustillo MD Provider: Antione Palomino MD    Anesthesia Type: MAC ASA Status: 3            Anesthesia Type: MAC    Vitals  Vitals Value Taken Time   /77 09/08/23 1100   Temp     Pulse 49 09/08/23 1100   Resp 20 09/08/23 1100   SpO2 95 % 09/08/23 1100           Post Anesthesia Care and Evaluation    Patient location during evaluation: bedside  Patient participation: complete - patient participated  Level of consciousness: sleepy but conscious  Pain score: 0  Pain management: adequate    Airway patency: patent  Anesthetic complications: No anesthetic complications    Cardiovascular status: acceptable  Respiratory status: acceptable  Hydration status: acceptable    Comments: /77   Pulse (!) 49   Resp 20   Wt 108 kg (237 lb 1.6 oz)   SpO2 95%   BMI 36.05 kg/m²

## 2023-09-08 NOTE — ANESTHESIA PREPROCEDURE EVALUATION
Anesthesia Evaluation     Patient summary reviewed and Nursing notes reviewed   NPO Solid Status: > 8 hours  NPO Liquid Status: > 8 hours           Airway   Mallampati: II  Neck ROM: full  No difficulty expected  Dental      Comment: Multiple caps all over    Pulmonary     breath sounds clear to auscultation  (+) a smoker Former,sleep apnea  Cardiovascular     Rhythm: regular    (+) hypertension, CAD, hyperlipidemia      Neuro/Psych  (+) numbness, psychiatric history Anxiety and Depression  GI/Hepatic/Renal/Endo    (+) diabetes mellitus    Musculoskeletal     Abdominal   (+) obese   Substance History      OB/GYN          Other   arthritis,                   Anesthesia Plan    ASA 3     MAC     intravenous induction     Anesthetic plan, risks, benefits, and alternatives have been provided, discussed and informed consent has been obtained with: patient.    CODE STATUS:

## 2023-09-08 NOTE — H&P
[facility]  Pre Procedure History & Physical    Chief Complaint:   Time for my ehgd    Subjective     HPI:   68 y.o. female presenting to endoscopy unit today for egd     Past Medical History:   Past Medical History:   Diagnosis Date    Anxiety and depression     Cataracts, bilateral     Coronary artery calcification     Diabetic peripheral neuropathy     DM (diabetes mellitus), type 2     HTN (hypertension)     Hyperlipidemia     Matta's neuroma of both feet     Overactive bladder     Pancreatitis     GALLBLADDER PANCREATITIS    Peripheral edema     Sleep apnea     NO MACHINE    Trouble swallowing     Urethral granuloma        Family History:  Family History   Problem Relation Age of Onset    Polycystic kidney disease Mother     No Known Problems Father     Breast cancer Sister         LATE 50'S    Diabetes Sister     Polycystic kidney disease Brother     Diabetes Brother     Polycystic kidney disease Brother     Hyperlipidemia Daughter     Other Daughter         FATTY LIVER     Diabetes Maternal Aunt     Malig Hyperthermia Neg Hx        Social History:   reports that she quit smoking about 8 years ago. Her smoking use included cigarettes. She has a 35.00 pack-year smoking history. She has never used smokeless tobacco. She reports current alcohol use. She reports that she does not use drugs.    Medications:   Medications Prior to Admission   Medication Sig Dispense Refill Last Dose    aspirin 81 MG EC tablet Take 1 tablet by mouth Daily.   9/7/2023    atorvastatin (Lipitor) 40 MG tablet Take 1 tablet by mouth Daily. 90 tablet 1 9/6/2023    diazePAM (VALIUM) 2 MG tablet Take 1-2 tablets by mouth Daily As Needed for Anxiety. 20 tablet 0 9/6/2023    escitalopram (LEXAPRO) 20 MG tablet Take 1 tablet by mouth Daily. 90 tablet 1 9/7/2023    lisinopril (PRINIVIL,ZESTRIL) 2.5 MG tablet Take 1 tablet by mouth Daily. 90 tablet 1 9/6/2023    metFORMIN ER (GLUCOPHAGE-XR) 500 MG 24 hr tablet TAKE 2  TABLETS BY MOUTH DAILY 180 tablet 1 9/7/2023    vitamin D3 125 MCG (5000 UT) capsule capsule Take 1 capsule by mouth Daily.   9/7/2023    Denta 5000 Plus 1.1 % cream See Admin Instructions.   More than a month       Allergies:  Codeine    Objective     Blood pressure 142/71, pulse 50, resp. rate 16, weight 108 kg (237 lb 1.6 oz), SpO2 94 %, not currently breastfeeding.  Physical Exam:   General: patient awake, alert and cooperative    Assessment & Plan     Diagnosis:  Thick e on CT    Anticipated Surgical Procedure:  Egd     The risks, benefits, and alternatives of this procedure have been discussed with the patient or the responsible party- the patient understands and agrees to proceed.

## 2023-09-12 ENCOUNTER — TELEPHONE (OUTPATIENT)
Dept: GASTROENTEROLOGY | Facility: CLINIC | Age: 68
End: 2023-09-12
Payer: COMMERCIAL

## 2023-09-12 LAB
LAB AP CASE REPORT: NORMAL
LAB AP SPECIAL STAINS: NORMAL
PATH REPORT.FINAL DX SPEC: NORMAL
PATH REPORT.GROSS SPEC: NORMAL

## 2023-09-12 NOTE — TELEPHONE ENCOUNTER
Called patient and informed her that when Dr. Bustillo gave us the results and recommendations we would give her a call back and let her know what to do moving forward. She verbalized understanding

## 2023-09-12 NOTE — TELEPHONE ENCOUNTER
Caller: Kishan Selene BOLANOS    Relationship: Self    Best call back number: [phone]    What is the best time to reach you: ANYTIME AND CAN LEAVE A MESSAGE    Who are you requesting to speak with (clinical staff, provider,  specific staff member): CLINICAL      What was the call regarding: PATIENT HAD A EGD ON 09/08/23 AND IS WANTING TO KNOW, DOES SHE NEED A FOLLOW UP? SHE WAS PUT ON PROTONIX FOR 3 MONTHS AND SHE STATED THAT SHE DIDN'T HAVE ANY PAIN, BUT THEY TOLD HER THAT SHE HAD ULCERS. SHE IS WANTING TO KNOW HOW ARE THEY GOING TO KNOW IF THOSE ARE GONE. SHE WOULD LIKE TO TALK TO A NURSE TO DISCUSS.

## 2023-09-14 ENCOUNTER — TELEPHONE (OUTPATIENT)
Dept: GASTROENTEROLOGY | Facility: CLINIC | Age: 68
End: 2023-09-14
Payer: COMMERCIAL

## 2023-09-14 NOTE — TELEPHONE ENCOUNTER
----- Message from Piotr Bustillo MD sent at 9/14/2023 12:36 PM EDT -----  Biopsies benign  Continue PPI  Follow-up with PCP

## 2023-09-14 NOTE — TELEPHONE ENCOUNTER
Pt reviewed results via [employer]. Sent pt [employer] msg advising of results. Advised to call if any questions.

## 2023-10-17 RX ORDER — PRAVASTATIN SODIUM 80 MG/1
TABLET ORAL
Qty: 90 TABLET | Refills: 1 | OUTPATIENT
Start: 2023-10-17

## 2023-10-17 RX ORDER — METFORMIN HYDROCHLORIDE 500 MG/1
1000 TABLET, EXTENDED RELEASE ORAL DAILY
Qty: 180 TABLET | Refills: 1 | Status: SHIPPED | OUTPATIENT
Start: 2023-10-17

## 2024-01-07 NOTE — PROGRESS NOTES
"  Darell Garcia D.O.  Internal Medicine  [facility]  [address], [address]  [place] [address]  [phone]    Chief Complaint  Annual checkup    HISTORY    Selene Holley is a 68 y.o. female who presents to the office today as a  an established patient for their annual preventative exam.      {Hospitalization(s) history:[id number]::\"No hospitalization(s) within the last year.\"}     Current exercise regimen:    Status of chronic medical conditions:    Anxiety and Depression: takes escitalopram 20 mg daily.  Has diazepam 2 mg tablets that she uses as needed    MARYANNE: not using CPAP currently      Type 2 diabetes: takes metformin ER 1000 mg daily. She doesn't check blood sugar at home.   Lab Results   Component Value Date    HGBA1C 6.60 (H) 07/27/2023        HTN: prescribed lisinopril 2.5 mg daily      HLD/ Coronary artery calcification: currently taking atorvastatin 40 mg daily (increased from pravastatin 80 mg daily at last visit due to uncontrolled LDL) and 81 mg daily   Lab Results   Component Value Date    CHOL 199 11/11/2020    CHLPL 197 04/24/2023    TRIG 157 (H) 04/24/2023    HDL 58 04/24/2023     (H) 04/24/2023       Any other concerns regarding their health today:     Health Maintenance Summary            Overdue - BMI FOLLOWUP (Yearly) Never done      No completion history exists for this topic.              Overdue - COVID-19 Vaccine (1) Never done      No completion history exists for this topic.              Overdue - Pneumococcal Vaccine 65+ (1 of 2 - PCV) Never done      No completion history exists for this topic.              Overdue - ZOSTER VACCINE (1 of 2) Never done      No completion history exists for this topic.              Overdue - DIABETIC FOOT EXAM (Yearly) Overdue since 12/28/2021 12/28/2020  SCANNED - FOOT EXAM              Overdue - INFLUENZA VACCINE (Yearly - August to March) Never done      No completion history exists for this topic.         "      Overdue - DIABETIC EYE EXAM (Yearly) Overdue since 8/29/2023 08/29/2022  SCANNED - EYE EXAM    03/23/2021  Done    02/18/2020  SCANNED - EYE EXAM              Overdue - ANNUAL PHYSICAL (Yearly) Overdue since 11/3/2023      11/03/2022  Done    08/20/2021  Done              HEMOGLOBIN A1C (Every 6 Months) Next due on 1/27/2024 07/27/2023  Hemoglobin A1C component of Hemoglobin A1c    04/24/2023  Hemoglobin A1C component of Hemoglobin A1c    10/10/2022  Hemoglobin A1C component of Hemoglobin A1c    03/18/2022  Hemoglobin A1C component of Hemoglobin A1c    08/20/2021  Hemoglobin A1C component of Hemoglobin A1c    Only the first 5 history entries have been loaded, but more history exists.              TDAP/TD VACCINES (2 - Td or Tdap) Next due on 4/21/2024 04/21/2014  Imm Admin: Tdap              LIPID PANEL (Yearly) Next due on 4/24/2024 04/24/2023  Lipid Panel With LDL/HDL Ratio    10/10/2022  Lipid Panel With LDL/HDL Ratio    03/18/2022  Lipid Panel    08/20/2021  Lipid Panel    11/11/2020  Lipid Panel    Only the first 5 history entries have been loaded, but more history exists.              URINE MICROALBUMIN (Yearly) Next due on 4/24/2024 04/24/2023  Multiple components of Microalbumin / Creatinine Urine Ratio - Urine, Clean Catch    03/18/2022  Multiple components of Microalbumin / Creatinine Urine Ratio - Urine, Clean Catch    11/11/2020  Multiple components of Microalbumin / Creatinine Urine Ratio - Urine, Clean Catch    09/12/2018  MicroAlbumin, Urine, Random -              LUNG CANCER SCREENING (Yearly) Next due on 5/22/2024 05/22/2023   CT Chest Low Dose Cancer Screening WO              MAMMOGRAM (Every 2 Years) Next due on 3/1/2025      03/01/2023  Mammo Screening Digital Tomosynthesis Bilateral With CAD    03/01/2023  Mammo Screening Digital Tomosynthesis Bilateral With CAD    02/11/2022  Mammo Screening Bilateral With CAD    02/11/2022  Mammo Screening Digital  "Tomosynthesis Bilateral With CAD    01/30/2021  Mammo Screening Digital Tomosynthesis Bilateral With CAD    Only the first 5 history entries have been loaded, but more history exists.              DXA SCAN (Every 2 Years) Next due on 5/22/2025 05/22/2023  DEXA Bone Density Axial              COLORECTAL CANCER SCREENING (COLONOSCOPY - Every 10 Years) Next due on 4/9/2028 04/09/2018  COLONOSCOPY (Done)    04/09/2018  SCANNED - COLONOSCOPY              HEPATITIS C SCREENING  Completed      08/20/2021  Hep C Virus Ab component of Hepatitis C Antibody              Discontinued - PAP SMEAR  Discontinued      No completion history exists for this topic.                     Allergies   Allergen Reactions    Codeine Other (See Comments)     \"BAD DREAMS\"        No outpatient medications have been marked as taking for the 1/8/24 encounter (Appointment) with Darell Garcia DO.       {new/est histories:79701}    Immunization History   Administered Date(s) Administered    Tdap 04/21/2014        OBJECTIVE    Vital Signs:   There were no vitals taken for this visit.    Physical Exam     {The following data was reviewed by (Optional):99124}  {Ambulatory Labs (Optional):46939}  {Data reviewed (Optional):10332:::1}     The ASCVD Risk score (Justyn DK, et al., 2019) failed to calculate for the following reasons:    The patient has a prior MI or stroke diagnosis           ASSESSMENT & PLAN     Annual Preventative Health Examination  -Age and sex appropriate physical exam performed and documented. Updated past medical, family, social and surgical histories as well as allergies and care team list. Addressed care gaps listed in the medical record.  -Encouraged annual dental and vision exams as part of their overall health.  -Encouraged minimum of 30 minutes or more of exercise at a brisk walk or higher 5 days per week combined with a well-balanced diet.   -Immunizations reviewed and updated in EMR. {[facility] IMMUNIZATIONS:[id number]} " "recommended.  -Lipid screening:   Lipid Panel          4/24/2023    09:15   Lipid Panel   Total Cholesterol 197    Triglycerides 157    HDL Cholesterol 58    VLDL Cholesterol 27    LDL Cholesterol  112    LDL/HDL Ratio 1.86     {kdmlipidcpe:96036}   -Aspirin for primary or secondary prevention: {kdmaspirincpe:58678}  -Depression and Anxiety screening: {KDMDepressionCPE:84546}  -Diabetes screening: {kdmdiabetescpe:70409}   -Tobacco use screening: Patient {REPORTS/DENIES EC:96125} cigarette use. Tobacco counseling was {ACTIONS; WAS GIVEN/WAS NOT INDICATED:51272}.  -Alcohol use screening: {kdmalcoholcpe:15660}. Alcohol abuse counseling was {ACTIONS; WAS GIVEN/WAS NOT INDICATED:48957}.  -Illicit drug screening: Patient {DOES/DOES NOT:04850} use illicit drugs.   -Abdominal aortic aneurysm screening: {kdmAAAscreenCPE:86403}  -Hypertension screening: {kdmhypertensioncpe:42828}  -HIV screening: {kdmhivscreeningCPE:97895}   -Hepatitis C virus screening:  {kdmhepCcpe:83394}  -Syphilis screening: {kdmsyphilisscreenCPE:60150::\"Syphilis screening not indicated.\"}  -Hepatitis B virus screening: {kdmhepBcpe:11706}  -Colon cancer screening: {kdmcoloncancerscreeningcpe:37863}  -Lung cancer screening: {kdmlungcancerscreeningcpe:79567::\"Patient has never smoked.\"}  -Prostate cancer screening: {kdmprostatescreeningcpe:99479}      Follow up in 1 year for annual physical exam.    Patient/family had no further questions at this time and verbalized understanding of the plan discussed today.   "

## 2024-01-08 ENCOUNTER — OFFICE VISIT (OUTPATIENT)
Dept: INTERNAL MEDICINE | Facility: CLINIC | Age: 69
End: 2024-01-08
Payer: COMMERCIAL

## 2024-01-08 VITALS
HEART RATE: 66 BPM | BODY MASS INDEX: 34.71 KG/M2 | WEIGHT: 229 LBS | DIASTOLIC BLOOD PRESSURE: 76 MMHG | SYSTOLIC BLOOD PRESSURE: 118 MMHG | HEIGHT: 68 IN | OXYGEN SATURATION: 98 %

## 2024-01-08 DIAGNOSIS — R06.09 DYSPNEA ON EXERTION: ICD-10-CM

## 2024-01-08 DIAGNOSIS — F41.9 ANXIETY AND DEPRESSION: ICD-10-CM

## 2024-01-08 DIAGNOSIS — E11.40 TYPE 2 DIABETES MELLITUS WITH DIABETIC NEUROPATHY, WITHOUT LONG-TERM CURRENT USE OF INSULIN: ICD-10-CM

## 2024-01-08 DIAGNOSIS — I25.84 CORONARY ARTERY CALCIFICATION: ICD-10-CM

## 2024-01-08 DIAGNOSIS — I25.10 CORONARY ARTERY CALCIFICATION: ICD-10-CM

## 2024-01-08 DIAGNOSIS — E78.2 MIXED HYPERLIPIDEMIA: ICD-10-CM

## 2024-01-08 DIAGNOSIS — F32.A ANXIETY AND DEPRESSION: ICD-10-CM

## 2024-01-08 DIAGNOSIS — Z79.899 HIGH RISK MEDICATION USE: ICD-10-CM

## 2024-01-08 DIAGNOSIS — Z00.00 ANNUAL PHYSICAL EXAM: Primary | ICD-10-CM

## 2024-01-08 PROCEDURE — 99214 OFFICE O/P EST MOD 30 MIN: CPT | Performed by: STUDENT IN AN ORGANIZED HEALTH CARE EDUCATION/TRAINING PROGRAM

## 2024-01-08 PROCEDURE — 99397 PER PM REEVAL EST PAT 65+ YR: CPT | Performed by: STUDENT IN AN ORGANIZED HEALTH CARE EDUCATION/TRAINING PROGRAM

## 2024-01-08 PROCEDURE — 93000 ELECTROCARDIOGRAM COMPLETE: CPT | Performed by: STUDENT IN AN ORGANIZED HEALTH CARE EDUCATION/TRAINING PROGRAM

## 2024-01-08 NOTE — PROGRESS NOTES
"  Darell Garcia D.O.  Internal Medicine  [facility]  [address], [address]  [place] [address]  [phone]    Chief Complaint  Annual checkup    HISTORY    Selene Holley is a 68 y.o. female who presents to the office today as a  an established patient for their annual preventative exam.     No hospitalization(s) within the last year.     Current exercise regimen: swimming 3 times weekly 1-1.5 hours     Status of chronic medical conditions:    Anxiety and Depression: takes escitalopram 20 mg daily.  Has diazepam 2 mg tablets that she uses as needed, uses 2 or 3 per month. \"Hanging in there, I'm doing good\". Not in mental health counseling. \"Sometimes I feel like I need something else\". States right now she doesn't want any medication but she will call me if she changes her mind.     MARYANNE: using CPAP     Gastritis: EGD 9/2023 with negative biopsy. Follows with GI. Currently on 3 month course of PPI. She doesn't use NSAID products.     Type 2 diabetes: takes metformin ER 1000 mg daily. She doesn't check blood sugar at home.   Lab Results   Component Value Date    HGBA1C 6.60 (H) 07/27/2023     HTN: prescribed lisinopril 2.5 mg daily , doesn't check blood pressure at home      HLD/ Coronary artery calcification: currently taking atorvastatin 40 mg daily (increased from pravastatin 80 mg daily at last visit due to uncontrolled LDL) and aspirin 81 mg daily   Lab Results   Component Value Date    CHOL 199 11/11/2020    CHLPL 197 04/24/2023    TRIG 157 (H) 04/24/2023    HDL 58 04/24/2023     (H) 04/24/2023     Patient's overall comments about their health or any other specific health concerns they report: states she does feel \"a little\" winded with activity. Doesn't get winded at rest. She wouldn't say that she produces a lot of phlegm or cough. \"I have enough wind\".     GYN History:     *Patient's GYN Practice: none     *s/p hysterectomy     *Previous pregnancies: 3.  Live births: 4.  " Miscarriages: 0 . Elective abortions: 0.      Health Maintenance Summary            Overdue - BMI FOLLOWUP (Yearly) Never done      No completion history exists for this topic.              Overdue - COVID-19 Vaccine (1) Never done      No completion history exists for this topic.              Overdue - Pneumococcal Vaccine 65+ (1 of 2 - PCV) Never done      No completion history exists for this topic.              Overdue - ZOSTER VACCINE (1 of 2) Never done      No completion history exists for this topic.              Overdue - DIABETIC FOOT EXAM (Yearly) Overdue since 12/28/2021 12/28/2020  SCANNED - FOOT EXAM              Overdue - INFLUENZA VACCINE (Yearly - August to March) Never done      No completion history exists for this topic.              Overdue - DIABETIC EYE EXAM (Yearly) Overdue since 8/29/2023 08/29/2022  SCANNED - EYE EXAM    03/23/2021  Done    02/18/2020  SCANNED - EYE EXAM              Overdue - ANNUAL PHYSICAL (Yearly) Overdue since 11/3/2023      11/03/2022  Done    08/20/2021  Done              Ordered - HEMOGLOBIN A1C (Every 6 Months) Ordered on 1/8/2024 07/27/2023  Hemoglobin A1C component of Hemoglobin A1c    04/24/2023  Hemoglobin A1C component of Hemoglobin A1c    10/10/2022  Hemoglobin A1C component of Hemoglobin A1c    03/18/2022  Hemoglobin A1C component of Hemoglobin A1c    08/20/2021  Hemoglobin A1C component of Hemoglobin A1c    Only the first 5 history entries have been loaded, but more history exists.              TDAP/TD VACCINES (2 - Td or Tdap) Next due on 4/21/2024 04/21/2014  Imm Admin: Tdap              Ordered - LIPID PANEL (Yearly) Ordered on 1/8/2024 04/24/2023  Lipid Panel With LDL/HDL Ratio    10/10/2022  Lipid Panel With LDL/HDL Ratio    03/18/2022  Lipid Panel    08/20/2021  Lipid Panel    11/11/2020  Lipid Panel    Only the first 5 history entries have been loaded, but more history exists.              URINE MICROALBUMIN (Yearly) Next  "due on 4/24/2024 04/24/2023  Multiple components of Microalbumin / Creatinine Urine Ratio - Urine, Clean Catch    03/18/2022  Multiple components of Microalbumin / Creatinine Urine Ratio - Urine, Clean Catch    11/11/2020  Multiple components of Microalbumin / Creatinine Urine Ratio - Urine, Clean Catch    09/12/2018  MicroAlbumin, Urine, Random -              LUNG CANCER SCREENING (Yearly) Next due on 5/22/2024 05/22/2023   CT Chest Low Dose Cancer Screening WO              MAMMOGRAM (Every 2 Years) Next due on 3/1/2025      03/01/2023  Mammo Screening Digital Tomosynthesis Bilateral With CAD    03/01/2023  Mammo Screening Digital Tomosynthesis Bilateral With CAD    02/11/2022  Mammo Screening Bilateral With CAD    02/11/2022  Mammo Screening Digital Tomosynthesis Bilateral With CAD    01/30/2021  Mammo Screening Digital Tomosynthesis Bilateral With CAD    Only the first 5 history entries have been loaded, but more history exists.              DXA SCAN (Every 2 Years) Next due on 5/22/2025 05/22/2023  DEXA Bone Density Axial              COLORECTAL CANCER SCREENING (COLONOSCOPY - Every 10 Years) Next due on 4/9/2028 04/09/2018  COLONOSCOPY (Done)    04/09/2018  SCANNED - COLONOSCOPY              HEPATITIS C SCREENING  Completed      08/20/2021  Hep C Virus Ab component of Hepatitis C Antibody              Discontinued - PAP SMEAR  Discontinued      No completion history exists for this topic.                     Allergies   Allergen Reactions    Codeine Other (See Comments)     \"BAD DREAMS\"        Outpatient Medications Marked as Taking for the 1/8/24 encounter (Office Visit) with Darell Garcia, DO   Medication Sig Dispense Refill    aspirin 81 MG EC tablet Take 1 tablet by mouth Daily.      atorvastatin (Lipitor) 40 MG tablet Take 1 tablet by mouth Daily. 90 tablet 1    Denta 5000 Plus 1.1 % cream See Admin Instructions.      diazePAM (VALIUM) 2 MG tablet Take 1-2 tablets by mouth Daily As Needed " for Anxiety. 20 tablet 0    escitalopram (LEXAPRO) 20 MG tablet Take 1 tablet by mouth Daily. 90 tablet 1    lisinopril (PRINIVIL,ZESTRIL) 2.5 MG tablet Take 1 tablet by mouth Daily. 90 tablet 1    metFORMIN ER (GLUCOPHAGE-XR) 500 MG 24 hr tablet TAKE 2 TABLETS BY MOUTH DAILY 180 tablet 1    pantoprazole (PROTONIX) 40 MG EC tablet Take 1 tablet by mouth Daily. 30 tablet 12    vitamin D3 125 MCG (5000 UT) capsule capsule Take 1 capsule by mouth Daily.          Past Medical History:   Diagnosis Date    Anxiety and depression     Cataracts, bilateral     Coronary artery calcification     Diabetic peripheral neuropathy     DM (diabetes mellitus), type 2     HTN (hypertension)     Hyperlipidemia     Matta's neuroma of both feet     Overactive bladder     Pancreatitis     GALLBLADDER PANCREATITIS    Peripheral edema     Sleep apnea     NO MACHINE    Trouble swallowing     Urethral granuloma      Past Surgical History:   Procedure Laterality Date    CATARACT EXTRACTION W/ INTRAOCULAR LENS  IMPLANT, BILATERAL      CHOLECYSTECTOMY      COLONOSCOPY  04/2018    CLEAR/Q10 YEARS    CYST REMOVAL      LEFT THIGH;41 YEARS AGO    ENDOSCOPY N/A 09/08/2023    Procedure: ESOPHAGOGASTRODUODENOSCOPY with biopsy;  Surgeon: Piotr Bustillo MD;  Location: [facility] ENDOSCOPY;  Service: Gastroenterology;  Laterality: N/A;  PRE - abnormal ct  POST - gastritis, gastric ulcers    MOUTH SURGERY      periodontal    MYOMECTOMY  02/2002    TONSILLECTOMY AND ADENOIDECTOMY      TOTAL ABDOMINAL HYSTERECTOMY  2000     2 UTERINE FIBROIDS    TOTAL KNEE ARTHROPLASTY Right 2017     Family History   Problem Relation Age of Onset    Polycystic kidney disease Mother     No Known Problems Father     Breast cancer Sister         LATE 50'S    Hypertension Sister     Diabetes Sister     Hypertension Sister     Polycystic kidney disease Brother     Diabetes Brother     Polycystic kidney disease Brother     Kidney disease Brother         s/p transplant    Diabetes  "Brother     Hyperlipidemia Daughter     Other Daughter         FATTY LIVER     Diabetes Maternal Aunt     Sandi Balderas Hx     reports that she quit smoking about 9 years ago. Her smoking use included cigarettes. She has a 35.00 pack-year smoking history. She has never used smokeless tobacco. She reports current alcohol use of about 2.0 - 3.0 standard drinks of alcohol per week. She reports that she does not use drugs.    Immunization History   Administered Date(s) Administered    Tdap 04/21/2014        OBJECTIVE    Vital Signs:   /76   Pulse 66   Ht 172.7 cm (68\")   Wt 104 kg (229 lb)   SpO2 98%   BMI 34.82 kg/m²     Physical Exam  Vitals reviewed.   Constitutional:       General: She is not in acute distress.     Appearance: Normal appearance. She is obese. She is not ill-appearing.   HENT:      Head: Normocephalic and atraumatic.      Right Ear: Tympanic membrane, ear canal and external ear normal. There is no impacted cerumen.      Left Ear: Tympanic membrane, ear canal and external ear normal. There is no impacted cerumen.      Mouth/Throat:      Mouth: Mucous membranes are moist.      Pharynx: No oropharyngeal exudate or posterior oropharyngeal erythema.   Eyes:      General: No scleral icterus.     Extraocular Movements: Extraocular movements intact.      Conjunctiva/sclera: Conjunctivae normal.      Pupils: Pupils are equal, round, and reactive to light.   Cardiovascular:      Rate and Rhythm: Normal rate and regular rhythm.      Heart sounds: Normal heart sounds. No murmur heard.  Pulmonary:      Effort: Pulmonary effort is normal. No respiratory distress.      Breath sounds: Normal breath sounds. No wheezing.   Abdominal:      General: Bowel sounds are normal. There is no distension.      Palpations: Abdomen is soft.      Tenderness: There is no abdominal tenderness. There is no guarding.   Musculoskeletal:      Cervical back: Neck supple.      Right lower leg: No edema.      Left lower " leg: No edema.   Lymphadenopathy:      Cervical: No cervical adenopathy.   Skin:     General: Skin is warm and dry.      Coloration: Skin is not jaundiced.   Neurological:      General: No focal deficit present.      Mental Status: She is alert and oriented to person, place, and time.      Cranial Nerves: No cranial nerve deficit.      Motor: No weakness.   Psychiatric:         Mood and Affect: Mood normal.         Behavior: Behavior normal.         Thought Content: Thought content normal.                     ECG 12 Lead    Date/Time: 1/8/2024 8:45 AM  Performed by: Darell Garcia DO    Authorized by: Darell Garcia DO  Previous ECG: no previous ECG available  Rhythm: sinus bradycardia  Rate: bradycardic  Rate comments: 49  Conduction: conduction normal  ST Segments: ST segments normal  T Waves: T waves normal  QRS axis: normal  Other: no other findings    Clinical impression: abnormal EKG           ASSESSMENT & PLAN     #Annual Preventative Health Examination   -Age and sex appropriate physical exam performed and documented. Updated past medical, family, social and surgical histories as well as allergies and care team list. Addressed care gaps listed in the medical record.  -Encouraged annual dental and vision exams as part of their overall health.  -Encouraged minimum of 30 minutes or more of exercise at a brisk walk or higher 5 days per week combined with a well-balanced diet.  -Immunizations reviewed and updated in EMR. Pt declined all.  -Lipid screening:   Lipid Panel          4/24/2023    09:15   Lipid Panel   Total Cholesterol 197    Triglycerides 157    HDL Cholesterol 58    VLDL Cholesterol 27    LDL Cholesterol  112    LDL/HDL Ratio 1.86     Patient is already on statin therapy.   -Aspirin for primary or secondary prevention: on aspirin for secondary prevention  -Depression and Anxiety screening: Patient has known diagnosis of depression.  -Diabetes screening:  Patient already has a diagnosis of diabetes  mellitus and is being treated.   -Tobacco use screening: Patient denies cigarette use. Tobacco counseling was was not indicated.  -Alcohol use screening: Patient reports drinking 2 drinks per week.. Alcohol abuse counseling was was not indicated.  -Illicit drug screening: Patient does not use illicit drugs.  -Hypertension screening: Patient with known diagnosis of hypertension and is receiving treatment.  -HIV screening: Patient is over age 65, screening not indicated.  -Syphilis screening: Syphilis screening not indicated.  -Hepatitis B virus screening: Screening not indicated, not in a high-risk group.  -Hepatitis C virus screening:  Patient has already completed Hepatitis C screening. Negative screening on file.   -Colon cancer screening: Patient is already up to date on their colon cancer screening with colonoscopy is indicated again in 2028  -Lung cancer screening: Screening is up to date and negative.   -Cervical cancer screening:  s/p hysterectomy   -Breast cancer screening:  Was done approximately 3/2023 and the result was: Birads I (Normal)..  -Osteoporosis screening: normal DEXA 5/2023    Follow up in 1 year for annual physical exam.    Patient/family had no further questions at this time and verbalized understanding of the plan discussed today.     A problem-based visit was also conducted on the same day, see below for assessment and plan    Diagnoses and all orders for this visit:    1. Mixed hyperlipidemia (Primary)  2. Dyspnea on exertion  3. Coronary artery calcification  -currently taking atorvastatin 40 mg daily (increased from pravastatin 80 mg daily at last visit due to uncontrolled LDL) and aspirin 81 mg daily   Lab Results   Component Value Date    CHOL 199 11/11/2020    CHLPL 197 04/24/2023    TRIG 157 (H) 04/24/2023    HDL 58 04/24/2023     (H) 04/24/2023     -LDL goal less than 70. Will recheck lipid today given increased statin intensity. Adjust regimen as needed.   -states she does  "feel \"a little\" winded with activity. Doesn't get winded at rest. She wouldn't say that she produces a lot of phlegm or cough. \"I have enough wind\".   -for FAN, I would like to refer her for PFT and Exercise stress test. She is resistant to PFT at this time. She is satting 98% on room air and lungs are clear. She denies chest pain/tightness/nausea with activity or rest.   -EKG in office sinus bradycardia , no ST-T changes  -will send her for chest xray and also for exercise stress test. Further plan depending on result.   -     Lipid Pane  -     ECG 12 Lead  -     Treadmill Stress Test; Future    4. Type 2 diabetes mellitus with diabetic neuropathy, without long-term current use of insulin  -A1c trends on file for this patient:   A1C Last 3 Results          4/24/2023    09:15 7/27/2023    09:32   HGBA1C Last 3 Results   Hemoglobin A1C 6.80  6.60      -Goal A1c for this patient is less than 6.5%  -Current diabetes regimen: takes metformin ER 1000 mg daily. She doesn't check blood sugar at home.   -Changes made to diabetes regimen today: recheck A1c, previously good control. Consider GLP1 RA in the future if needed for weight and CV benefit.  -Diabetic kidney disease screening: up to date and negative, will repeat in 1 year  -check BMP  -will attempt to obtain diabetic eye result    5. Anxiety and depression  6. High risk medication use  - takes escitalopram 20 mg daily.  Has diazepam 2 mg tablets that she uses as needed, uses 2 or 3 per month. \"Hanging in there, I'm doing good\". Not in mental health counseling. \"Sometimes I feel like I need something else\". States right now she doesn't want any medication but she will call me if she changes her mind.   -OK to continue PRN use of diazepam. SHAHEEN reviewed and is appropriate. Will update UDS today.  -     Drug Profile Urine - 9 Drugs - Urine, Clean Catch            The following social determinates of health impact the patient's medical decision making: No social " determinates of health were factored in to today's visit.     Follow Up  Return in about 3 months (around 4/8/2024) for Recheck.

## 2024-01-09 DIAGNOSIS — I25.10 CORONARY ARTERY CALCIFICATION: ICD-10-CM

## 2024-01-09 DIAGNOSIS — I25.84 CORONARY ARTERY CALCIFICATION: ICD-10-CM

## 2024-01-09 DIAGNOSIS — E78.2 MIXED HYPERLIPIDEMIA: ICD-10-CM

## 2024-01-09 LAB
AMPHETAMINES UR QL SCN: NEGATIVE NG/ML
BARBITURATES UR QL SCN: NEGATIVE NG/ML
BENZODIAZ UR QL: NEGATIVE NG/ML
BUN SERPL-MCNC: 13 MG/DL (ref 8–27)
BUN/CREAT SERPL: 18 (ref 12–28)
BZE UR QL: NEGATIVE NG/ML
CALCIUM SERPL-MCNC: 9.5 MG/DL (ref 8.7–10.3)
CANNABINOIDS UR QL SCN: NEGATIVE NG/ML
CHLORIDE SERPL-SCNC: 104 MMOL/L (ref 96–106)
CHOLEST SERPL-MCNC: 165 MG/DL (ref 100–199)
CO2 SERPL-SCNC: 24 MMOL/L (ref 20–29)
CREAT SERPL-MCNC: 0.71 MG/DL (ref 0.57–1)
EGFRCR SERPLBLD CKD-EPI 2021: 93 ML/MIN/1.73
GLUCOSE SERPL-MCNC: 146 MG/DL (ref 70–99)
HBA1C MFR BLD: 6.7 % (ref 4.8–5.6)
HDLC SERPL-MCNC: 45 MG/DL
LDLC SERPL CALC-MCNC: 94 MG/DL (ref 0–99)
METHADONE UR QL SCN: NEGATIVE NG/ML
OPIATES UR QL: NEGATIVE NG/ML
PCP UR QL SCN: NEGATIVE NG/ML
POTASSIUM SERPL-SCNC: 4.2 MMOL/L (ref 3.5–5.2)
PROPOXYPH UR QL SCN: NEGATIVE NG/ML
SODIUM SERPL-SCNC: 142 MMOL/L (ref 134–144)
TRIGL SERPL-MCNC: 150 MG/DL (ref 0–149)
VLDLC SERPL CALC-MCNC: 26 MG/DL (ref 5–40)

## 2024-01-09 RX ORDER — ATORVASTATIN CALCIUM 80 MG/1
80 TABLET, FILM COATED ORAL DAILY
Qty: 90 TABLET | Refills: 1 | Status: SHIPPED | OUTPATIENT
Start: 2024-01-09

## 2024-01-11 ENCOUNTER — OFFICE VISIT (OUTPATIENT)
Dept: INTERNAL MEDICINE | Facility: CLINIC | Age: 69
End: 2024-01-11
Payer: COMMERCIAL

## 2024-01-11 VITALS
HEART RATE: 51 BPM | OXYGEN SATURATION: 97 % | WEIGHT: 229 LBS | HEIGHT: 68 IN | SYSTOLIC BLOOD PRESSURE: 108 MMHG | BODY MASS INDEX: 34.71 KG/M2 | DIASTOLIC BLOOD PRESSURE: 60 MMHG

## 2024-01-11 DIAGNOSIS — H81.11 BENIGN PAROXYSMAL POSITIONAL VERTIGO OF RIGHT EAR: Primary | ICD-10-CM

## 2024-01-11 PROCEDURE — 99213 OFFICE O/P EST LOW 20 MIN: CPT | Performed by: STUDENT IN AN ORGANIZED HEALTH CARE EDUCATION/TRAINING PROGRAM

## 2024-01-11 NOTE — PROGRESS NOTES
"  Darell Garcia D.O.  Internal Medicine  [facility]  [address], [address]  [place] [address]  [phone]      Chief Complaint  Dizziness (Started on Tuesday)    SUBJECTIVE    History of Present Illness    Selene Holley is a 68 y.o. female who presents to the office today as an established patient that last saw me on 1/8/2024.     Here today for acute care visit. Saturday 5 days ago she felt really really fatigued and didn't know if she can finish showering. On Tuesday 1/9/24 she felt on and off dizziness throughout the day and then at night it resolved. Yesterday the dizziness occurred again and occurred on and off. Today is the best day of the three days this has occurred in terms of symptom severity. She states bending over makes it worse and movement with the head can make it come on. She was walking down a hallway and felt like she was tipping over. Denies slurred speech, no muscle weakness. She describes the symptom as \"swaying \".    Allergies   Allergen Reactions    Codeine Other (See Comments)     \"BAD DREAMS\"        Outpatient Medications Marked as Taking for the 1/11/24 encounter (Office Visit) with Darell Garcia, DO   Medication Sig Dispense Refill    aspirin 81 MG EC tablet Take 1 tablet by mouth Daily.      atorvastatin (Lipitor) 80 MG tablet Take 1 tablet by mouth Daily. 90 tablet 1    Denta 5000 Plus 1.1 % cream See Admin Instructions.      diazePAM (VALIUM) 2 MG tablet Take 1-2 tablets by mouth Daily As Needed for Anxiety. 20 tablet 0    escitalopram (LEXAPRO) 20 MG tablet Take 1 tablet by mouth Daily. 90 tablet 1    lisinopril (PRINIVIL,ZESTRIL) 2.5 MG tablet Take 1 tablet by mouth Daily. 90 tablet 1    metFORMIN ER (GLUCOPHAGE-XR) 500 MG 24 hr tablet TAKE 2 TABLETS BY MOUTH DAILY 180 tablet 1    pantoprazole (PROTONIX) 40 MG EC tablet Take 1 tablet by mouth Daily. 30 tablet 12    vitamin D3 125 MCG (5000 UT) capsule capsule Take 1 capsule by mouth Daily.          Past " "Medical History:   Diagnosis Date    Anxiety and depression     Cataracts, bilateral     Coronary artery calcification     Diabetic peripheral neuropathy     DM (diabetes mellitus), type 2     HTN (hypertension)     Hyperlipidemia     Matta's neuroma of both feet     Overactive bladder     Pancreatitis     GALLBLADDER PANCREATITIS    Peripheral edema     Sleep apnea     NO MACHINE    Trouble swallowing     Urethral granuloma        OBJECTIVE    Vital Signs:   /60   Pulse 51   Ht 172.7 cm (68\")   Wt 104 kg (229 lb)   SpO2 97%   BMI 34.82 kg/m²     Physical Exam  Vitals reviewed.   Constitutional:       General: She is not in acute distress.     Appearance: She is obese. She is not ill-appearing.   HENT:      Head:      Comments: +sangeeta hallpike to the right     Right Ear: Tympanic membrane, ear canal and external ear normal. There is no impacted cerumen.      Left Ear: Tympanic membrane, ear canal and external ear normal. There is no impacted cerumen.   Eyes:      General: No scleral icterus.     Pupils: Pupils are equal, round, and reactive to light.   Neurological:      Mental Status: She is alert.      Cranial Nerves: No cranial nerve deficit.   Psychiatric:         Mood and Affect: Mood normal.         Behavior: Behavior normal.         Thought Content: Thought content normal.                             ASSESSMENT & PLAN     Diagnoses and all orders for this visit:    1. Benign paroxysmal positional vertigo of right ear (Primary)    -pt presents with signs and symptoms consistent with BPPV on the right. Discussed the nature of the condition and performed one Epley Maneuver in office with some improvement. Lacks other signs/symptoms to suggest alternative condition. Provided instructions for Epley maneuver at home. Referral to vestibular rehab placed. Return if no improvement.         The following social determinates of health impact the patient's medical decision making: No social determinates of " health were factored in to today's visit.     Follow Up  No follow-ups on file.    Patient/family had no further questions at this time and verbalized understanding of the plan discussed today.

## 2024-01-22 ENCOUNTER — HOSPITAL ENCOUNTER (OUTPATIENT)
Dept: GENERAL RADIOLOGY | Facility: HOSPITAL | Age: 69
Discharge: HOME | End: 2024-01-22
Admitting: STUDENT IN AN ORGANIZED HEALTH CARE EDUCATION/TRAINING PROGRAM
Payer: COMMERCIAL

## 2024-01-22 ENCOUNTER — TELEPHONE (OUTPATIENT)
Dept: CARDIOLOGY | Facility: CLINIC | Age: 69
End: 2024-01-22
Payer: COMMERCIAL

## 2024-01-22 DIAGNOSIS — R06.09 DYSPNEA ON EXERTION: ICD-10-CM

## 2024-01-22 PROCEDURE — 71046 X-RAY EXAM CHEST 2 VIEWS: CPT

## 2024-01-23 ENCOUNTER — OFFICE VISIT (OUTPATIENT)
Dept: CARDIOLOGY | Facility: CLINIC | Age: 69
End: 2024-01-23
Payer: COMMERCIAL

## 2024-01-23 ENCOUNTER — TRANSCRIBE ORDERS (OUTPATIENT)
Dept: CARDIOLOGY | Facility: CLINIC | Age: 69
End: 2024-01-23

## 2024-01-23 ENCOUNTER — HOSPITAL ENCOUNTER (OUTPATIENT)
Dept: CARDIOLOGY | Facility: HOSPITAL | Age: 69
Discharge: HOME | End: 2024-01-23
Payer: COMMERCIAL

## 2024-01-23 ENCOUNTER — DOCUMENTATION (OUTPATIENT)
Dept: INTERNAL MEDICINE | Facility: CLINIC | Age: 69
End: 2024-01-23
Payer: COMMERCIAL

## 2024-01-23 ENCOUNTER — LAB (OUTPATIENT)
Dept: LAB | Facility: HOSPITAL | Age: 69
End: 2024-01-23
Payer: COMMERCIAL

## 2024-01-23 VITALS
HEIGHT: 68 IN | SYSTOLIC BLOOD PRESSURE: 110 MMHG | DIASTOLIC BLOOD PRESSURE: 76 MMHG | BODY MASS INDEX: 34.71 KG/M2 | HEART RATE: 63 BPM | WEIGHT: 229 LBS | OXYGEN SATURATION: 99 %

## 2024-01-23 DIAGNOSIS — R06.09 DYSPNEA ON EXERTION: ICD-10-CM

## 2024-01-23 DIAGNOSIS — I25.10 CORONARY ARTERY CALCIFICATION: ICD-10-CM

## 2024-01-23 DIAGNOSIS — I10 ESSENTIAL HYPERTENSION: ICD-10-CM

## 2024-01-23 DIAGNOSIS — Z13.6 SCREENING FOR ISCHEMIC HEART DISEASE: ICD-10-CM

## 2024-01-23 DIAGNOSIS — I25.84 CORONARY ARTERY CALCIFICATION: ICD-10-CM

## 2024-01-23 DIAGNOSIS — Z01.810 PRE-OPERATIVE CARDIOVASCULAR EXAMINATION: ICD-10-CM

## 2024-01-23 DIAGNOSIS — Z01.810 PRE-OPERATIVE CARDIOVASCULAR EXAMINATION: Primary | ICD-10-CM

## 2024-01-23 DIAGNOSIS — E78.2 MIXED HYPERLIPIDEMIA: ICD-10-CM

## 2024-01-23 DIAGNOSIS — R94.39 ABNORMAL STRESS TEST: Primary | ICD-10-CM

## 2024-01-23 DIAGNOSIS — R94.39 ABNORMAL STRESS ECG WITH TREADMILL: Primary | ICD-10-CM

## 2024-01-23 LAB
ANION GAP SERPL CALCULATED.3IONS-SCNC: 12.3 MMOL/L (ref 5–15)
BASOPHILS # BLD AUTO: 0.02 10*3/MM3 (ref 0–0.2)
BASOPHILS NFR BLD AUTO: 0.4 % (ref 0–1.5)
BH CV STRESS BP STAGE 1: NORMAL
BH CV STRESS DURATION MIN STAGE 1: 3
BH CV STRESS DURATION MIN STAGE 2: 0
BH CV STRESS DURATION SEC STAGE 1: 0
BH CV STRESS DURATION SEC STAGE 2: 5
BH CV STRESS GRADE STAGE 1: 10
BH CV STRESS GRADE STAGE 2: 12
BH CV STRESS HR STAGE 1: 143
BH CV STRESS HR STAGE 2: 143
BH CV STRESS METS STAGE 1: 5
BH CV STRESS METS STAGE 2: 5
BH CV STRESS PROTOCOL 1: NORMAL
BH CV STRESS RECOVERY BP: NORMAL MMHG
BH CV STRESS RECOVERY HR: 64 BPM
BH CV STRESS SPEED STAGE 1: 1.7
BH CV STRESS SPEED STAGE 2: 2.5
BH CV STRESS STAGE 1: 1
BH CV STRESS STAGE 2: 2
BUN SERPL-MCNC: 14 MG/DL (ref 8–23)
BUN/CREAT SERPL: 19.7 (ref 7–25)
CALCIUM SPEC-SCNC: 9.3 MG/DL (ref 8.6–10.5)
CHLORIDE SERPL-SCNC: 105 MMOL/L (ref 98–107)
CO2 SERPL-SCNC: 23.7 MMOL/L (ref 22–29)
CREAT SERPL-MCNC: 0.71 MG/DL (ref 0.57–1)
DEPRECATED RDW RBC AUTO: 43.7 FL (ref 37–54)
EGFRCR SERPLBLD CKD-EPI 2021: 93.3 ML/MIN/1.73
EOSINOPHIL # BLD AUTO: 0.07 10*3/MM3 (ref 0–0.4)
EOSINOPHIL NFR BLD AUTO: 1.5 % (ref 0.3–6.2)
ERYTHROCYTE [DISTWIDTH] IN BLOOD BY AUTOMATED COUNT: 13.3 % (ref 12.3–15.4)
GLUCOSE SERPL-MCNC: 115 MG/DL (ref 65–99)
HCT VFR BLD AUTO: 38.8 % (ref 34–46.6)
HGB BLD-MCNC: 13 G/DL (ref 12–15.9)
IMM GRANULOCYTES # BLD AUTO: 0.02 10*3/MM3 (ref 0–0.05)
IMM GRANULOCYTES NFR BLD AUTO: 0.4 % (ref 0–0.5)
LYMPHOCYTES # BLD AUTO: 1.48 10*3/MM3 (ref 0.7–3.1)
LYMPHOCYTES NFR BLD AUTO: 31.4 % (ref 19.6–45.3)
MAXIMAL PREDICTED HEART RATE: 153 BPM
MCH RBC QN AUTO: 30.2 PG (ref 26.6–33)
MCHC RBC AUTO-ENTMCNC: 33.5 G/DL (ref 31.5–35.7)
MCV RBC AUTO: 90.2 FL (ref 79–97)
MONOCYTES # BLD AUTO: 0.43 10*3/MM3 (ref 0.1–0.9)
MONOCYTES NFR BLD AUTO: 9.1 % (ref 5–12)
NEUTROPHILS NFR BLD AUTO: 2.69 10*3/MM3 (ref 1.7–7)
NEUTROPHILS NFR BLD AUTO: 57.2 % (ref 42.7–76)
NRBC BLD AUTO-RTO: 0 /100 WBC (ref 0–0.2)
PERCENT MAX PREDICTED HR: 93.46 %
PLATELET # BLD AUTO: 222 10*3/MM3 (ref 140–450)
PMV BLD AUTO: 10.6 FL (ref 6–12)
POTASSIUM SERPL-SCNC: 4.4 MMOL/L (ref 3.5–5.2)
RBC # BLD AUTO: 4.3 10*6/MM3 (ref 3.77–5.28)
SODIUM SERPL-SCNC: 141 MMOL/L (ref 136–145)
STRESS BASELINE BP: NORMAL MMHG
STRESS BASELINE HR: 66 BPM
STRESS PERCENT HR: 110 %
STRESS POST ESTIMATED WORKLOAD: 5 METS
STRESS POST EXERCISE DUR MIN: 3 MIN
STRESS POST EXERCISE DUR SEC: 5 SEC
STRESS POST PEAK BP: NORMAL MMHG
STRESS POST PEAK HR: 143 BPM
STRESS TARGET HR: 130 BPM
WBC NRBC COR # BLD AUTO: 4.71 10*3/MM3 (ref 3.4–10.8)

## 2024-01-23 PROCEDURE — 93016 CV STRESS TEST SUPVJ ONLY: CPT | Performed by: INTERNAL MEDICINE

## 2024-01-23 PROCEDURE — 36415 COLL VENOUS BLD VENIPUNCTURE: CPT

## 2024-01-23 PROCEDURE — 93018 CV STRESS TEST I&R ONLY: CPT | Performed by: INTERNAL MEDICINE

## 2024-01-23 PROCEDURE — 85025 COMPLETE CBC W/AUTO DIFF WBC: CPT

## 2024-01-23 PROCEDURE — 93017 CV STRESS TEST TRACING ONLY: CPT

## 2024-01-23 PROCEDURE — 80048 BASIC METABOLIC PNL TOTAL CA: CPT

## 2024-01-23 RX ORDER — METOPROLOL SUCCINATE 25 MG/1
25 TABLET, EXTENDED RELEASE ORAL DAILY
Qty: 90 TABLET | Refills: 3 | Status: SHIPPED | OUTPATIENT
Start: 2024-01-23 | End: 2024-01-23 | Stop reason: SDUPTHER

## 2024-01-23 RX ORDER — ATORVASTATIN CALCIUM 40 MG/1
40 TABLET, FILM COATED ORAL DAILY
Qty: 90 TABLET | Refills: 1 | OUTPATIENT
Start: 2024-01-23

## 2024-01-23 RX ORDER — ESCITALOPRAM OXALATE 20 MG/1
20 TABLET ORAL DAILY
Qty: 90 TABLET | Refills: 1 | Status: SHIPPED | OUTPATIENT
Start: 2024-01-23

## 2024-01-23 RX ORDER — LISINOPRIL 2.5 MG/1
2.5 TABLET ORAL DAILY
Qty: 90 TABLET | Refills: 1 | Status: SHIPPED | OUTPATIENT
Start: 2024-01-23 | End: 2024-01-23

## 2024-01-23 RX ORDER — METOPROLOL SUCCINATE 25 MG/1
12.5 TABLET, EXTENDED RELEASE ORAL DAILY
Qty: 90 TABLET | Refills: 3 | Status: SHIPPED | OUTPATIENT
Start: 2024-01-23

## 2024-01-23 NOTE — H&P (VIEW-ONLY)
PATIENTINFORMATION    Date of Office Visit: 2024  Encounter Provider: Earl Kruger MD  Place of Service: [facility] CARDIOLOGY  Patient Name: Selene Holley  : 1955    Subjective:     Encounter Date:2024      Patient ID: Selene Holley is a 68 y.o. female.    No chief complaint on file.    HPI  Ms. Holley is a pleasant 67 yo lady who came to cardiology clinic for treadmill test for evaluation of worsening exertional shortness of breath.  She did only 3 minutes and stopped because of shortness of breath and fatigue along with ischemic EKG changes that recovered 4 minutes into recovery.  She has been having exertional shortness of breath for several months.  She denies rest or exertional chest discomfort, orthopnea, PND, palpitations, presyncope syncope.  She had chronic lower extremity swelling that has not changed much.  She is an ex-smoker and noted to have coronary artery calcification involving LAD on cancer screening CT.  Currently on aspirin, statin and antihypertensive that she is fairly compliant with.    Denies family history of premature coronary artery disease.      ROS  All systems reviewed and negative except as noted in HPI.    Past Medical History:   Diagnosis Date    Anxiety and depression     Cataracts, bilateral     Coronary artery calcification     Diabetic peripheral neuropathy     DM (diabetes mellitus), type 2     HTN (hypertension)     Hyperlipidemia     Matta's neuroma of both feet     Overactive bladder     Pancreatitis     GALLBLADDER PANCREATITIS    Peripheral edema     Sleep apnea     NO MACHINE    Trouble swallowing     Urethral granuloma        Past Surgical History:   Procedure Laterality Date    CATARACT EXTRACTION W/ INTRAOCULAR LENS  IMPLANT, BILATERAL      CHOLECYSTECTOMY      COLONOSCOPY  2018    CLEAR/Q10 YEARS    CYST REMOVAL      LEFT THIGH;41 YEARS AGO    ENDOSCOPY N/A 2023    Procedure: ESOPHAGOGASTRODUODENOSCOPY with biopsy;   Surgeon: Piotr Bustillo MD;  [facility]: [facility] ENDOSCOPY;  Service: Gastroenterology;  Laterality: N/A;  PRE - abnormal ct  POST - gastritis, gastric ulcers    MOUTH SURGERY      periodontal    MYOMECTOMY  2002    TONSILLECTOMY AND ADENOIDECTOMY      TOTAL ABDOMINAL HYSTERECTOMY       2 UTERINE FIBROIDS    TOTAL KNEE ARTHROPLASTY Right 2017       Social History     Socioeconomic History    Marital status:    Tobacco Use    Smoking status: Former     Packs/day: 1.00     Years: 35.00     Additional pack years: 0.00     Total pack years: 35.00     Types: Cigarettes     Quit date:      Years since quittin.0    Smokeless tobacco: Never   Vaping Use    Vaping Use: Never used   Substance and Sexual Activity    Alcohol use: Yes     Alcohol/week: 2.0 - 3.0 standard drinks of alcohol     Types: 2 - 3 Standard drinks or equivalent per week    Drug use: Never    Sexual activity: Defer       Family History   Problem Relation Age of Onset    Polycystic kidney disease Mother     No Known Problems Father     Breast cancer Sister         LATE 50'S    Hypertension Sister     Diabetes Sister     Hypertension Sister     Polycystic kidney disease Brother     Diabetes Brother     Polycystic kidney disease Brother     Kidney disease Brother         s/p transplant    Diabetes Brother     Hyperlipidemia Daughter     Other Daughter         FATTY LIVER     Diabetes Maternal Aunt     Malig Hyperthermia Neg Hx          Procedures       Objective:     There were no vitals taken for this visit. There is no height or weight on file to calculate BMI.     Constitutional:       General: Not in acute distress.     Appearance: Well-developed. Not diaphoretic.   Eyes:      Pupils: Pupils are equal, round, and reactive to light.   HENT:      Head: Normocephalic and atraumatic.   Neck:      Thyroid: No thyromegaly.   Pulmonary:      Effort: Pulmonary effort is normal. No respiratory distress.      Breath sounds: Normal breath  sounds. No wheezing. No rales.   Chest:      Chest wall: Not tender to palpatation.   Cardiovascular:      Normal rate. Regular rhythm.      No gallop.    Pulses:     Intact distal pulses.   Edema:     Peripheral edema absent.   Abdominal:      General: Bowel sounds are normal. There is no distension.      Palpations: Abdomen is soft.      Tenderness: There is no guarding.   Musculoskeletal: Normal range of motion.         General: No deformity.      Cervical back: Normal range of motion and neck supple. Skin:     General: Skin is warm and dry.      Findings: No rash.   Neurological:      Mental Status: Alert and oriented to person, place, and time.      Cranial Nerves: No cranial nerve deficit.      Deep Tendon Reflexes: Reflexes are normal and symmetric.   Psychiatric:         Judgment: Judgment normal.         Review Of Data: I have reviewed pertinent recent labs, images and documents and pertinent findings included in HPI or assessment below.    Lipid Panel          4/24/2023    09:15 1/8/2024    09:08   Lipid Panel   Total Cholesterol 197  165    Triglycerides 157  150    HDL Cholesterol 58  45    VLDL Cholesterol 27  26    LDL Cholesterol  112  94    LDL/HDL Ratio 1.86           Assessment/Plan:           Abnormal stress ECG with treadmill-diffuse horizontal 1-2 mm ST segment depression noted that started after first minute.  Did not completely return to baseline 4 minutes into recovery.  Appropriate blood pressure response.  She had shortness of breath but no chest discomfort.  EKG findings are concerning for significant CAD.  Essential hypertension-fairly controlled  Hypercholesterolemia on statin-LDL near goal, mildly elevated triglyceride levels.  Type II DM that is fairly controlled    She will get coronary angiogram  Continue aspirin and statin.  Start low-dose beta-blocker while holding lisinopril to be more room for blood pressure.  I have communicated with Dr. Garcia  Diagnosis and plan of care  discussed with patient and verbalized understanding.            Your medication list            Accurate as of January 23, 2024  1:01 PM. If you have any questions, ask your nurse or doctor.                CONTINUE taking these medications        Instructions Last Dose Given Next Dose Due   aspirin 81 MG EC tablet      Take 1 tablet by mouth Daily.       atorvastatin 80 MG tablet  Commonly known as: Lipitor      Take 1 tablet by mouth Daily.       Denta 5000 Plus 1.1 % cream  Generic drug: Sodium Fluoride      See Admin Instructions.       diazePAM 2 MG tablet  Commonly known as: VALIUM      Take 1-2 tablets by mouth Daily As Needed for Anxiety.       escitalopram 20 MG tablet  Commonly known as: LEXAPRO      TAKE 1 TABLET BY MOUTH EVERY DAY       lisinopril 2.5 MG tablet  Commonly known as: PRINIVIL,ZESTRIL      TAKE 1 TABLET BY MOUTH EVERY DAY       metFORMIN  MG 24 hr tablet  Commonly known as: GLUCOPHAGE-XR      TAKE 2 TABLETS BY MOUTH DAILY       pantoprazole 40 MG EC tablet  Commonly known as: PROTONIX      Take 1 tablet by mouth Daily.       vitamin D3 125 MCG (5000 UT) capsule capsule      Take 1 capsule by mouth Daily.                 Where to Get Your Medications        These medications were sent to [facility]/pharmacy #[id number] - [place], [place] - [address] - [phone]  - [phone]   [address] [address]      Phone: [phone]   escitalopram 20 MG tablet  lisinopril 2.5 MG tablet             Earl Kruger MD  01/23/24  13:01 EST

## 2024-01-23 NOTE — PROGRESS NOTES
PATIENTINFORMATION    Date of Office Visit: 2024  Encounter Provider: Earl Kruger MD  Place of Service: [facility] CARDIOLOGY  Patient Name: Selene Holley  : 1955    Subjective:     Encounter Date:2024      Patient ID: Selene Holley is a 68 y.o. female.    No chief complaint on file.    HPI  Ms. Holley is a pleasant 69 yo lady who came to cardiology clinic for treadmill test for evaluation of worsening exertional shortness of breath.  She did only 3 minutes and stopped because of shortness of breath and fatigue along with ischemic EKG changes that recovered 4 minutes into recovery.  She has been having exertional shortness of breath for several months.  She denies rest or exertional chest discomfort, orthopnea, PND, palpitations, presyncope syncope.  She had chronic lower extremity swelling that has not changed much.  She is an ex-smoker and noted to have coronary artery calcification involving LAD on cancer screening CT.  Currently on aspirin, statin and antihypertensive that she is fairly compliant with.    Denies family history of premature coronary artery disease.      ROS  All systems reviewed and negative except as noted in HPI.    Past Medical History:   Diagnosis Date    Anxiety and depression     Cataracts, bilateral     Coronary artery calcification     Diabetic peripheral neuropathy     DM (diabetes mellitus), type 2     HTN (hypertension)     Hyperlipidemia     Matta's neuroma of both feet     Overactive bladder     Pancreatitis     GALLBLADDER PANCREATITIS    Peripheral edema     Sleep apnea     NO MACHINE    Trouble swallowing     Urethral granuloma        Past Surgical History:   Procedure Laterality Date    CATARACT EXTRACTION W/ INTRAOCULAR LENS  IMPLANT, BILATERAL      CHOLECYSTECTOMY      COLONOSCOPY  2018    CLEAR/Q10 YEARS    CYST REMOVAL      LEFT THIGH;41 YEARS AGO    ENDOSCOPY N/A 2023    Procedure: ESOPHAGOGASTRODUODENOSCOPY with biopsy;   Surgeon: Piotr Bustillo MD;  Location: [facility] ENDOSCOPY;  Service: Gastroenterology;  Laterality: N/A;  PRE - abnormal ct  POST - gastritis, gastric ulcers    MOUTH SURGERY      periodontal    MYOMECTOMY  2002    TONSILLECTOMY AND ADENOIDECTOMY      TOTAL ABDOMINAL HYSTERECTOMY       2 UTERINE FIBROIDS    TOTAL KNEE ARTHROPLASTY Right 2017       Social History     Socioeconomic History    Marital status:    Tobacco Use    Smoking status: Former     Packs/day: 1.00     Years: 35.00     Additional pack years: 0.00     Total pack years: 35.00     Types: Cigarettes     Quit date:      Years since quittin.0    Smokeless tobacco: Never   Vaping Use    Vaping Use: Never used   Substance and Sexual Activity    Alcohol use: Yes     Alcohol/week: 2.0 - 3.0 standard drinks of alcohol     Types: 2 - 3 Standard drinks or equivalent per week    Drug use: Never    Sexual activity: Defer       Family History   Problem Relation Age of Onset    Polycystic kidney disease Mother     No Known Problems Father     Breast cancer Sister         LATE 50'S    Hypertension Sister     Diabetes Sister     Hypertension Sister     Polycystic kidney disease Brother     Diabetes Brother     Polycystic kidney disease Brother     Kidney disease Brother         s/p transplant    Diabetes Brother     Hyperlipidemia Daughter     Other Daughter         FATTY LIVER     Diabetes Maternal Aunt     Malig Hyperthermia Neg Hx          Procedures       Objective:     There were no vitals taken for this visit. There is no height or weight on file to calculate BMI.     Constitutional:       General: Not in acute distress.     Appearance: Well-developed. Not diaphoretic.   Eyes:      Pupils: Pupils are equal, round, and reactive to light.   HENT:      Head: Normocephalic and atraumatic.   Neck:      Thyroid: No thyromegaly.   Pulmonary:      Effort: Pulmonary effort is normal. No respiratory distress.      Breath sounds: Normal breath  sounds. No wheezing. No rales.   Chest:      Chest wall: Not tender to palpatation.   Cardiovascular:      Normal rate. Regular rhythm.      No gallop.    Pulses:     Intact distal pulses.   Edema:     Peripheral edema absent.   Abdominal:      General: Bowel sounds are normal. There is no distension.      Palpations: Abdomen is soft.      Tenderness: There is no guarding.   Musculoskeletal: Normal range of motion.         General: No deformity.      Cervical back: Normal range of motion and neck supple. Skin:     General: Skin is warm and dry.      Findings: No rash.   Neurological:      Mental Status: Alert and oriented to person, place, and time.      Cranial Nerves: No cranial nerve deficit.      Deep Tendon Reflexes: Reflexes are normal and symmetric.   Psychiatric:         Judgment: Judgment normal.         Review Of Data: I have reviewed pertinent recent labs, images and documents and pertinent findings included in HPI or assessment below.    Lipid Panel          4/24/2023    09:15 1/8/2024    09:08   Lipid Panel   Total Cholesterol 197  165    Triglycerides 157  150    HDL Cholesterol 58  45    VLDL Cholesterol 27  26    LDL Cholesterol  112  94    LDL/HDL Ratio 1.86           Assessment/Plan:           Abnormal stress ECG with treadmill-diffuse horizontal 1-2 mm ST segment depression noted that started after first minute.  Did not completely return to baseline 4 minutes into recovery.  Appropriate blood pressure response.  She had shortness of breath but no chest discomfort.  EKG findings are concerning for significant CAD.  Essential hypertension-fairly controlled  Hypercholesterolemia on statin-LDL near goal, mildly elevated triglyceride levels.  Type II DM that is fairly controlled    She will get coronary angiogram  Continue aspirin and statin.  Start low-dose beta-blocker while holding lisinopril to be more room for blood pressure.  I have communicated with Dr. Garcia  Diagnosis and plan of care  discussed with patient and verbalized understanding.            Your medication list            Accurate as of January 23, 2024  1:01 PM. If you have any questions, ask your nurse or doctor.                CONTINUE taking these medications        Instructions Last Dose Given Next Dose Due   aspirin 81 MG EC tablet      Take 1 tablet by mouth Daily.       atorvastatin 80 MG tablet  Commonly known as: Lipitor      Take 1 tablet by mouth Daily.       Denta 5000 Plus 1.1 % cream  Generic drug: Sodium Fluoride      See Admin Instructions.       diazePAM 2 MG tablet  Commonly known as: VALIUM      Take 1-2 tablets by mouth Daily As Needed for Anxiety.       escitalopram 20 MG tablet  Commonly known as: LEXAPRO      TAKE 1 TABLET BY MOUTH EVERY DAY       lisinopril 2.5 MG tablet  Commonly known as: PRINIVIL,ZESTRIL      TAKE 1 TABLET BY MOUTH EVERY DAY       metFORMIN  MG 24 hr tablet  Commonly known as: GLUCOPHAGE-XR      TAKE 2 TABLETS BY MOUTH DAILY       pantoprazole 40 MG EC tablet  Commonly known as: PROTONIX      Take 1 tablet by mouth Daily.       vitamin D3 125 MCG (5000 UT) capsule capsule      Take 1 capsule by mouth Daily.                 Where to Get Your Medications        These medications were sent to [facility]/pharmacy #[id number] - [place], [place] - [address] - [phone]  - [phone]   [address] [address]      Phone: [phone]   escitalopram 20 MG tablet  lisinopril 2.5 MG tablet             Earl Kruger MD  01/23/24  13:01 EST

## 2024-01-26 ENCOUNTER — TELEPHONE (OUTPATIENT)
Dept: CARDIOLOGY | Facility: CLINIC | Age: 69
End: 2024-01-26
Payer: COMMERCIAL

## 2024-01-26 ENCOUNTER — HOSPITAL ENCOUNTER (OUTPATIENT)
Facility: HOSPITAL | Age: 69
Setting detail: HOSPITAL OUTPATIENT SURGERY
Discharge: HOME | End: 2024-01-26
Attending: INTERNAL MEDICINE | Admitting: INTERNAL MEDICINE
Payer: COMMERCIAL

## 2024-01-26 VITALS
DIASTOLIC BLOOD PRESSURE: 63 MMHG | BODY MASS INDEX: 34.71 KG/M2 | WEIGHT: 229 LBS | HEIGHT: 68 IN | HEART RATE: 44 BPM | OXYGEN SATURATION: 97 % | SYSTOLIC BLOOD PRESSURE: 108 MMHG | TEMPERATURE: 98.6 F | RESPIRATION RATE: 18 BRPM

## 2024-01-26 DIAGNOSIS — R94.39 ABNORMAL STRESS ECG WITH TREADMILL: ICD-10-CM

## 2024-01-26 LAB — GLUCOSE BLDC GLUCOMTR-MCNC: 111 MG/DL (ref 70–130)

## 2024-01-26 PROCEDURE — 25010000002 FENTANYL CITRATE (PF) 50 MCG/ML SOLUTION: Performed by: INTERNAL MEDICINE

## 2024-01-26 PROCEDURE — 93458 L HRT ARTERY/VENTRICLE ANGIO: CPT | Performed by: INTERNAL MEDICINE

## 2024-01-26 PROCEDURE — 82948 REAGENT STRIP/BLOOD GLUCOSE: CPT

## 2024-01-26 PROCEDURE — 99152 MOD SED SAME PHYS/QHP 5/>YRS: CPT | Performed by: INTERNAL MEDICINE

## 2024-01-26 PROCEDURE — C1894 INTRO/SHEATH, NON-LASER: HCPCS | Performed by: INTERNAL MEDICINE

## 2024-01-26 PROCEDURE — 25510000001 IOPAMIDOL PER 1 ML: Performed by: INTERNAL MEDICINE

## 2024-01-26 PROCEDURE — 25010000002 HEPARIN (PORCINE) PER 1000 UNITS: Performed by: INTERNAL MEDICINE

## 2024-01-26 PROCEDURE — 25010000002 MIDAZOLAM PER 1 MG: Performed by: INTERNAL MEDICINE

## 2024-01-26 PROCEDURE — 25810000003 SODIUM CHLORIDE 0.9 % SOLUTION: Performed by: INTERNAL MEDICINE

## 2024-01-26 PROCEDURE — C1769 GUIDE WIRE: HCPCS | Performed by: INTERNAL MEDICINE

## 2024-01-26 RX ORDER — SODIUM CHLORIDE 0.9 % (FLUSH) 0.9 %
3 SYRINGE (ML) INJECTION EVERY 12 HOURS SCHEDULED
Status: DISCONTINUED | OUTPATIENT
Start: 2024-01-26 | End: 2024-01-26 | Stop reason: HOSPADM

## 2024-01-26 RX ORDER — SODIUM CHLORIDE 0.9 % (FLUSH) 0.9 %
10 SYRINGE (ML) INJECTION AS NEEDED
Status: DISCONTINUED | OUTPATIENT
Start: 2024-01-26 | End: 2024-01-26 | Stop reason: HOSPADM

## 2024-01-26 RX ORDER — SODIUM CHLORIDE 9 MG/ML
40 INJECTION, SOLUTION INTRAVENOUS AS NEEDED
Status: DISCONTINUED | OUTPATIENT
Start: 2024-01-26 | End: 2024-01-26 | Stop reason: HOSPADM

## 2024-01-26 RX ORDER — SODIUM CHLORIDE 9 MG/ML
75 INJECTION, SOLUTION INTRAVENOUS CONTINUOUS
Status: DISCONTINUED | OUTPATIENT
Start: 2024-01-26 | End: 2024-01-26 | Stop reason: HOSPADM

## 2024-01-26 RX ORDER — MIDAZOLAM HYDROCHLORIDE 1 MG/ML
INJECTION INTRAMUSCULAR; INTRAVENOUS
Status: DISCONTINUED | OUTPATIENT
Start: 2024-01-26 | End: 2024-01-26 | Stop reason: HOSPADM

## 2024-01-26 RX ORDER — FENTANYL CITRATE 50 UG/ML
INJECTION, SOLUTION INTRAMUSCULAR; INTRAVENOUS
Status: DISCONTINUED | OUTPATIENT
Start: 2024-01-26 | End: 2024-01-26 | Stop reason: HOSPADM

## 2024-01-26 RX ORDER — ACETAMINOPHEN 325 MG/1
650 TABLET ORAL EVERY 4 HOURS PRN
Status: DISCONTINUED | OUTPATIENT
Start: 2024-01-26 | End: 2024-01-26 | Stop reason: HOSPADM

## 2024-01-26 RX ORDER — LIDOCAINE HYDROCHLORIDE 20 MG/ML
INJECTION, SOLUTION INFILTRATION; PERINEURAL
Status: DISCONTINUED | OUTPATIENT
Start: 2024-01-26 | End: 2024-01-26 | Stop reason: HOSPADM

## 2024-01-26 RX ORDER — SODIUM CHLORIDE 0.9 % (FLUSH) 0.9 %
10 SYRINGE (ML) INJECTION EVERY 12 HOURS SCHEDULED
Status: DISCONTINUED | OUTPATIENT
Start: 2024-01-26 | End: 2024-01-26 | Stop reason: HOSPADM

## 2024-01-26 RX ORDER — VERAPAMIL HYDROCHLORIDE 2.5 MG/ML
INJECTION, SOLUTION INTRAVENOUS
Status: DISCONTINUED | OUTPATIENT
Start: 2024-01-26 | End: 2024-01-26 | Stop reason: HOSPADM

## 2024-01-26 RX ORDER — SODIUM CHLORIDE 9 MG/ML
100 INJECTION, SOLUTION INTRAVENOUS CONTINUOUS
Status: ACTIVE | OUTPATIENT
Start: 2024-01-26 | End: 2024-01-26

## 2024-01-26 RX ORDER — HEPARIN SODIUM 1000 [USP'U]/ML
INJECTION, SOLUTION INTRAVENOUS; SUBCUTANEOUS
Status: DISCONTINUED | OUTPATIENT
Start: 2024-01-26 | End: 2024-01-26 | Stop reason: HOSPADM

## 2024-01-26 RX ADMIN — SODIUM CHLORIDE 75 ML/HR: 9 INJECTION, SOLUTION INTRAVENOUS at 10:30

## 2024-01-26 NOTE — TELEPHONE ENCOUNTER
Called and left VM. Will continue to try to reach patient. HUB transfer call to triage.     Nadia Bonilla RN  Triage [facility]

## 2024-01-26 NOTE — TELEPHONE ENCOUNTER
Selene Holley returned call.  Reviewed results and recommendations with patient and she verbalized understanding.  Scheduled 3 month f/u with Dr. Kruger.    Dr. Kruger,  Patient is asking if she still needs to have echo done that is scheduled on 2/15?    Please let me know how you would like to proceed.    Thank you,  Nargis BLANTON RN  Triage Nurse [facility]   15:06 EST

## 2024-01-26 NOTE — Clinical Note
Hemostasis started on the right radial artery. R-Band was used in achieving hemostasis. Radial compression device applied to vessel. Hemostasis achieved successfully. Closure device additional comment: VASC BAND- 15CC'S

## 2024-01-26 NOTE — TELEPHONE ENCOUNTER
----- Message from Earl Kruger MD sent at 1/26/2024 12:47 PM EST -----  Please notify Ms. Mrs Holley that she does not have significant blockages of heart arteries.  Continue current medical regimen.  Follow-up with me or Jesus in 3 months.  Thank you

## 2024-01-26 NOTE — DISCHARGE INSTRUCTIONS
Resume your Metformin in 48 hours     [facility]  [address] [place] [address]    Coronary Angiogram (Radial/Ulnar Approach) After Care    Refer to this sheet in the next few weeks. These instructions provide you with information on caring for yourself after your procedure. Your caregiver may also give you more specific instructions. Your treatment has been planned according to current medical practices, but problems sometimes occur. Call your caregiver if you have any problems or questions after your procedure.    Home Care Instructions:  You may shower the day after the procedure. Remove the bandage (dressing) and gently wash the site with plain soap and water. Gently pat the site dry. You may apply a band aid daily for 2 days if desired.    Do not apply powder or lotion to the site.  Do not submerge the affected site in water for 3 to 5 days or until the site is completely healed.   Do not lift, push or pull anything over 5 pounds for 5 days after your procedure or as directed by your physician.  As a reference, a gallon of milk weighs 8 pounds.   Inspect the site at least twice daily. You may notice some bruising at the site and it may be tender for 1 to 2 weeks.     Increase your fluid intake for the next 2 days.    Keep arm elevated for 24 hours. For the remainder of the day, keep your arm in “Pledge of Allegiance” position when up and about.     You may drive 24 hours after the procedure unless otherwise instructed by your caregiver.  Do not operate machinery or power tools for 24 hours.  A responsible adult should be with you for the first 24 hours after you arrive home. Do not make any important legal decisions or sign legal papers for 24 hours.  Do not drink alcohol for 24 hours.    Metformin or any medications containing Metformin should not be taken for 48 hours after your procedure.      Call Your Doctor if:   You have unusual pain at the radial/ulnar (wrist) site.  You  have redness, warmth, swelling, or pain at the radial/ulnar (wrist) site.  You have drainage (other than a small amount of blood on the dressing).  `You have chills or a fever > 101.  Your arm becomes pale or dark, cool, tingly, or numb.  You develop chest pain, shortness of breath, feel faint or pass out.    You have heavy bleeding from the site, hold pressure on the site for 20 minutes.  If the bleeding stops, apply a fresh bandage and call your cardiologist.  However, if you        continue to have bleeding, call 911 and continue to apply pressure to the site.   You have any symptoms of a stroke.  Remember BE FAST  B-balance. Sudden trouble walking or loss of balance.  E-eyes.  Sudden changes in how you see or a sudden onset of a very bad headache.   F-face. Sudden weakness or loss of feeling of the face or facial droop on one side.   A-arms Sudden weakness or numbness in one arm.  One arm drifts down if they are both held out in front of you. This happens suddenly and usually on one side of the body.   S-speech.  Sudden trouble speaking, slurred speech or trouble understanding what are saying.   T-time  Time to call emergency services.  Write down the symptoms and the time they started.

## 2024-01-26 NOTE — PROGRESS NOTES
Please notify Ms. Mrs Holley that she does not have significant blockages of heart arteries.  Continue current medical regimen.  Follow-up with me or Jesus in 3 months.  Thank you

## 2024-01-29 NOTE — TELEPHONE ENCOUNTER
Left voicemail for Selene Holley requesting callback.    HUB: please transfer to Triage if patient returns call     Thank you,  Nargis BLANTON RN  Triage Nurse [facility]   16:18 EST    . 0 = understands/communicates without difficulty

## 2024-02-15 ENCOUNTER — HOSPITAL ENCOUNTER (OUTPATIENT)
Dept: CARDIOLOGY | Facility: HOSPITAL | Age: 69
Discharge: HOME | End: 2024-02-15
Admitting: INTERNAL MEDICINE
Payer: COMMERCIAL

## 2024-02-15 VITALS
BODY MASS INDEX: 34.71 KG/M2 | WEIGHT: 229 LBS | OXYGEN SATURATION: 98 % | HEART RATE: 87 BPM | SYSTOLIC BLOOD PRESSURE: 125 MMHG | DIASTOLIC BLOOD PRESSURE: 71 MMHG | HEIGHT: 68 IN

## 2024-02-15 DIAGNOSIS — R94.39 ABNORMAL STRESS ECG WITH TREADMILL: ICD-10-CM

## 2024-02-15 LAB
AORTIC ARCH: 2.7 CM
AORTIC DIMENSIONLESS INDEX: 0.8 (DI)
ASCENDING AORTA: 3.3 CM
BH CV ECHO MEAS - ACS: 2.07 CM
BH CV ECHO MEAS - AO MAX PG: 6.3 MMHG
BH CV ECHO MEAS - AO MEAN PG: 3.5 MMHG
BH CV ECHO MEAS - AO ROOT DIAM: 3.1 CM
BH CV ECHO MEAS - AO V2 MAX: 126 CM/SEC
BH CV ECHO MEAS - AO V2 VTI: 33.1 CM
BH CV ECHO MEAS - AVA(I,D): 2.5 CM2
BH CV ECHO MEAS - EDV(CUBED): 134 ML
BH CV ECHO MEAS - EDV(MOD-SP2): 174 ML
BH CV ECHO MEAS - EDV(MOD-SP4): 134 ML
BH CV ECHO MEAS - EF(MOD-BP): 64.4 %
BH CV ECHO MEAS - EF(MOD-SP2): 62.6 %
BH CV ECHO MEAS - EF(MOD-SP4): 63.4 %
BH CV ECHO MEAS - ESV(CUBED): 29.4 ML
BH CV ECHO MEAS - ESV(MOD-SP2): 65 ML
BH CV ECHO MEAS - ESV(MOD-SP4): 49 ML
BH CV ECHO MEAS - FS: 39.7 %
BH CV ECHO MEAS - IVS/LVPW: 0.86 CM
BH CV ECHO MEAS - IVSD: 0.97 CM
BH CV ECHO MEAS - LAT PEAK E' VEL: 12.4 CM/SEC
BH CV ECHO MEAS - LV DIASTOLIC VOL/BSA (35-75): 61.9 CM2
BH CV ECHO MEAS - LV MASS(C)D: 200.8 GRAMS
BH CV ECHO MEAS - LV MAX PG: 5.4 MMHG
BH CV ECHO MEAS - LV MEAN PG: 2.04 MMHG
BH CV ECHO MEAS - LV SYSTOLIC VOL/BSA (12-30): 22.6 CM2
BH CV ECHO MEAS - LV V1 MAX: 116.3 CM/SEC
BH CV ECHO MEAS - LV V1 VTI: 26.3 CM
BH CV ECHO MEAS - LVIDD: 5.1 CM
BH CV ECHO MEAS - LVIDS: 3.1 CM
BH CV ECHO MEAS - LVOT AREA: 3.2 CM2
BH CV ECHO MEAS - LVOT DIAM: 2.01 CM
BH CV ECHO MEAS - LVPWD: 1.12 CM
BH CV ECHO MEAS - MED PEAK E' VEL: 9.6 CM/SEC
BH CV ECHO MEAS - MR MAX PG: 51.3 MMHG
BH CV ECHO MEAS - MR MAX VEL: 358.2 CM/SEC
BH CV ECHO MEAS - MV A DUR: 0.12 SEC
BH CV ECHO MEAS - MV A MAX VEL: 83.2 CM/SEC
BH CV ECHO MEAS - MV DEC SLOPE: 515.1 CM/SEC2
BH CV ECHO MEAS - MV DEC TIME: 0.2 SEC
BH CV ECHO MEAS - MV E MAX VEL: 98.5 CM/SEC
BH CV ECHO MEAS - MV E/A: 1.18
BH CV ECHO MEAS - MV MAX PG: 5.5 MMHG
BH CV ECHO MEAS - MV MEAN PG: 1.16 MMHG
BH CV ECHO MEAS - MV P1/2T: 69.8 MSEC
BH CV ECHO MEAS - MV V2 VTI: 44.3 CM
BH CV ECHO MEAS - MVA(P1/2T): 3.2 CM2
BH CV ECHO MEAS - MVA(VTI): 1.89 CM2
BH CV ECHO MEAS - PA ACC TIME: 0.18 SEC
BH CV ECHO MEAS - PA V2 MAX: 107.5 CM/SEC
BH CV ECHO MEAS - PI END-D VEL: 44.5 CM/SEC
BH CV ECHO MEAS - PULM A REVS DUR: 0.1 SEC
BH CV ECHO MEAS - PULM A REVS VEL: 30.7 CM/SEC
BH CV ECHO MEAS - PULM DIAS VEL: 79.1 CM/SEC
BH CV ECHO MEAS - PULM S/D: 0.94
BH CV ECHO MEAS - PULM SYS VEL: 74.1 CM/SEC
BH CV ECHO MEAS - QP/QS: 0.68
BH CV ECHO MEAS - RAP SYSTOLE: 3 MMHG
BH CV ECHO MEAS - RV MAX PG: 2.02 MMHG
BH CV ECHO MEAS - RV V1 MAX: 71.1 CM/SEC
BH CV ECHO MEAS - RV V1 VTI: 18.9 CM
BH CV ECHO MEAS - RVOT DIAM: 1.95 CM
BH CV ECHO MEAS - RVSP: 23 MMHG
BH CV ECHO MEAS - SI(MOD-SP2): 50.3 ML/M2
BH CV ECHO MEAS - SI(MOD-SP4): 39.3 ML/M2
BH CV ECHO MEAS - SUP REN AO DIAM: 2.4 CM
BH CV ECHO MEAS - SV(LVOT): 83.8 ML
BH CV ECHO MEAS - SV(MOD-SP2): 109 ML
BH CV ECHO MEAS - SV(MOD-SP4): 85 ML
BH CV ECHO MEAS - SV(RVOT): 56.9 ML
BH CV ECHO MEAS - TAPSE (>1.6): 2.7 CM
BH CV ECHO MEAS - TR MAX PG: 19.7 MMHG
BH CV ECHO MEAS - TR MAX VEL: 222 CM/SEC
BH CV ECHO MEASUREMENTS AVERAGE E/E' RATIO: 8.95
BH CV XLRA - RV BASE: 3.7 CM
BH CV XLRA - RV LENGTH: 7 CM
BH CV XLRA - RV MID: 2.4 CM
BH CV XLRA - TDI S': 12.4 CM/SEC
LEFT ATRIUM VOLUME INDEX: 24.8 ML/M2
SINUS: 3.1 CM
STJ: 2.6 CM

## 2024-02-15 PROCEDURE — 25510000001 PERFLUTREN (DEFINITY) 8.476 MG IN SODIUM CHLORIDE (PF) 0.9 % 10 ML INJECTION: Performed by: INTERNAL MEDICINE

## 2024-02-15 PROCEDURE — 93306 TTE W/DOPPLER COMPLETE: CPT

## 2024-02-15 RX ADMIN — PERFLUTREN 2 ML: 6.52 INJECTION, SUSPENSION INTRAVENOUS at 09:09

## 2024-02-16 ENCOUNTER — OFFICE VISIT (OUTPATIENT)
Dept: SLEEP MEDICINE | Facility: HOSPITAL | Age: 69
End: 2024-02-16
Payer: COMMERCIAL

## 2024-02-16 VITALS — HEIGHT: 68 IN | WEIGHT: 234 LBS | HEART RATE: 51 BPM | BODY MASS INDEX: 35.46 KG/M2 | OXYGEN SATURATION: 98 %

## 2024-02-16 DIAGNOSIS — E66.9 OBESITY (BMI 30-39.9): ICD-10-CM

## 2024-02-16 DIAGNOSIS — G47.33 OBSTRUCTIVE SLEEP APNEA: Primary | ICD-10-CM

## 2024-02-16 PROCEDURE — G0463 HOSPITAL OUTPT CLINIC VISIT: HCPCS

## 2024-03-05 ENCOUNTER — TELEPHONE (OUTPATIENT)
Dept: INTERNAL MEDICINE | Facility: CLINIC | Age: 69
End: 2024-03-05
Payer: COMMERCIAL

## 2024-03-05 DIAGNOSIS — Z12.31 ENCOUNTER FOR SCREENING MAMMOGRAM FOR MALIGNANT NEOPLASM OF BREAST: Primary | ICD-10-CM

## 2024-03-05 NOTE — TELEPHONE ENCOUNTER
Patient is scheduled tomorrow for a mammogram screening at [facility] tomorrow. Can you please put in mammogram screening order.    [facility] fax # [phone]

## 2024-04-09 ENCOUNTER — OFFICE VISIT (OUTPATIENT)
Dept: INTERNAL MEDICINE | Facility: CLINIC | Age: 69
End: 2024-04-09
Payer: COMMERCIAL

## 2024-04-09 VITALS
SYSTOLIC BLOOD PRESSURE: 118 MMHG | DIASTOLIC BLOOD PRESSURE: 64 MMHG | BODY MASS INDEX: 35.77 KG/M2 | OXYGEN SATURATION: 97 % | HEART RATE: 57 BPM | HEIGHT: 68 IN | WEIGHT: 236 LBS

## 2024-04-09 DIAGNOSIS — F41.9 ANXIETY AND DEPRESSION: ICD-10-CM

## 2024-04-09 DIAGNOSIS — I25.10 CORONARY ARTERY DISEASE INVOLVING NATIVE CORONARY ARTERY OF NATIVE HEART WITHOUT ANGINA PECTORIS: ICD-10-CM

## 2024-04-09 DIAGNOSIS — E78.2 MIXED HYPERLIPIDEMIA: ICD-10-CM

## 2024-04-09 DIAGNOSIS — I10 ESSENTIAL HYPERTENSION: Primary | ICD-10-CM

## 2024-04-09 DIAGNOSIS — F32.A ANXIETY AND DEPRESSION: ICD-10-CM

## 2024-04-09 DIAGNOSIS — E11.40 TYPE 2 DIABETES MELLITUS WITH DIABETIC NEUROPATHY, WITHOUT LONG-TERM CURRENT USE OF INSULIN: ICD-10-CM

## 2024-04-09 PROCEDURE — 99214 OFFICE O/P EST MOD 30 MIN: CPT | Performed by: STUDENT IN AN ORGANIZED HEALTH CARE EDUCATION/TRAINING PROGRAM

## 2024-04-09 RX ORDER — BUPROPION HYDROCHLORIDE 150 MG/1
TABLET ORAL
Qty: 45 TABLET | Refills: 0 | Status: SHIPPED | OUTPATIENT
Start: 2024-04-09 | End: 2024-05-09

## 2024-04-09 NOTE — PROGRESS NOTES
"  Darell Garcia D.O.  Internal Medicine  [facility]  [address], [address]  [place] [address]  [phone]      Chief Complaint  Diabetes    SUBJECTIVE    History of Present Illness    Selene Holley is a 69 y.o. female who presents to the office today as an established patient that last saw me on 1/11/2024.     Type 2 diabetes: takes metformin ER 1000 mg daily. She doesn't check blood sugar at home. States she doesn't want to go grocery shopping because of her mood and she isn't even motivated to get healthy food.   Lab Results   Component Value Date    HGBA1C 6.7 (H) 01/08/2024     HTN: cardiology stopped lisinopril 2.5 mg daily and changed regimen to metoprolol 12.5 mg daily. Doesn't check BP at home.      HLD/ Coronary artery disease: currently taking atorvastatin 80 mg daily, increased from 40 mg daily at last visit and aspirin 81 mg daily  .     States she went to see her  for the first visit and is getting a divorce. Currently taking escitalopram 20 mg daily for anxiety and depression and uses Valium a few times per month. States her  is a narcissist and she cannot do it any more.  States she is \"Devastated every day\". States she could sit her and cry right now.     Allergies   Allergen Reactions    Codeine Other (See Comments)     \"BAD DREAMS\"        Outpatient Medications Marked as Taking for the 4/9/24 encounter (Office Visit) with Darell Garcia, DO   Medication Sig Dispense Refill    aspirin 81 MG EC tablet Take 1 tablet by mouth Daily.      atorvastatin (Lipitor) 80 MG tablet Take 1 tablet by mouth Daily. 90 tablet 1    Denta 5000 Plus 1.1 % cream See Admin Instructions.      diazePAM (VALIUM) 2 MG tablet Take 1-2 tablets by mouth Daily As Needed for Anxiety. 20 tablet 0    escitalopram (LEXAPRO) 20 MG tablet TAKE 1 TABLET BY MOUTH EVERY DAY 90 tablet 1    metFORMIN ER (GLUCOPHAGE-XR) 500 MG 24 hr tablet TAKE 2 TABLETS BY MOUTH DAILY 180 tablet 1    metoprolol " "succinate XL (TOPROL-XL) 25 MG 24 hr tablet Take 0.5 tablets by mouth Daily. 90 tablet 3    vitamin D3 125 MCG (5000 UT) capsule capsule Take 1 capsule by mouth Daily.          Past Medical History:   Diagnosis Date    Anxiety and depression     Cataracts, bilateral     Coronary artery calcification     Diabetic peripheral neuropathy     DM (diabetes mellitus), type 2     Gastric ulcer     Hyperlipidemia     Matta's neuroma of both feet     Overactive bladder     Pancreatitis     GALLBLADDER PANCREATITIS    Peripheral edema     Sleep apnea     CPAP    Trouble swallowing     Urethral granuloma        OBJECTIVE    Vital Signs:   /64   Pulse 57   Ht 172.7 cm (68\")   Wt 107 kg (236 lb)   SpO2 97%   BMI 35.88 kg/m²        Physical Exam  Vitals reviewed.   Constitutional:       General: She is not in acute distress.     Appearance: She is obese. She is not ill-appearing.   Eyes:      General: No scleral icterus.  Cardiovascular:      Rate and Rhythm: Normal rate and regular rhythm.      Heart sounds: Normal heart sounds. No murmur heard.  Pulmonary:      Effort: Pulmonary effort is normal. No respiratory distress.      Breath sounds: Normal breath sounds. No wheezing.   Musculoskeletal:      Right lower leg: Edema (trace pretibial and ankle) present.      Left lower leg: Edema (trace pretibial and ankle) present.   Neurological:      Mental Status: She is alert.   Psychiatric:         Behavior: Behavior normal.         Thought Content: Thought content normal.      Comments: Depressed mood and affect                               ASSESSMENT & PLAN     Diagnoses and all orders for this visit:    1. Essential hypertension (Primary)  -cardiology stopped lisinopril 2.5 mg daily and changed regimen to metoprolol 12.5 mg daily. Doesn't check BP at home.    -BP great 118/65  -continue current regimen    2. Mixed hyperlipidemia  3. Coronary artery disease involving native coronary artery of native heart without angina " "pectoris  Lab Results   Component Value Date    CHOL 199 11/11/2020    CHLPL 165 01/08/2024    TRIG 150 (H) 01/08/2024    HDL 45 01/08/2024    LDL 94 01/08/2024     -I reviewed most recent cardiac cath as well as cardiology progress note  -overall LDL goal less than 70, triglyceride less than 150  -CAD is being managed medically  -currently taking atorvastatin 80 mg daily, increased from 40 mg daily at last visit and aspirin 81 mg daily  .   -recheck fasting lipid with recent increase in statin    4. Type 2 diabetes mellitus with diabetic neuropathy, without long-term current use of insulin  -A1c trends on file for this patient:   A1C Last 3 Results          4/24/2023    09:15 7/27/2023    09:32 1/8/2024    09:08   HGBA1C Last 3 Results   Hemoglobin A1C 6.80  6.60  6.7      -Goal A1c for this patient is less than 6.5%  -Current diabetes regimen: takes metformin ER 1000 mg daily. She doesn't check blood sugar at home. States she doesn't want to go grocery shopping because of her mood and she isn't even motivated to get healthy food.   -Changes made to diabetes regimen today: recheck A1c today. Given her obesity and CAD, ozempic would be a great addition in the future but we are focusing on adjusting her anxiety and depression medication today. Perhaps discuss adding this at next visit.   -Diabetic kidney disease screening: up to date and negative, will repeat in 1 year    5. Anxiety and depression  -GAD7 of 17, PHQ9 of 21 today   -States she went to see her  for the first visit and is getting a divorce. Currently taking escitalopram 20 mg daily for anxiety and depression and uses Valium a few times per month. States her  is a narcissist and she cannot do it any more.  States she is \"Devastated every day\". States she could sit her and cry right now.   -again discussed mental health counseling but she seems non-commital on that   -offered add on therapy with wellbutrin. Discussed common side effects of " Wellbutrin including diaphoresis, changes in weight, nausea, changes in bowel habits, changes in mood, headache, changes in sleep. Contraindicated in patients with seizure history. Discussed that in some patients anxiety does worsen. She is open to therapy. Will start titration as below and have her back in 4 weeks for follow up.   -     buPROPion XL (Wellbutrin XL) 150 MG 24 hr tablet; Take 1 tablet by mouth Daily for 15 days, THEN 2 tablets Every Morning for 15 days.  Dispense: 45 tablet; Refill: 0                Follow Up  Return in about 4 weeks (around 5/7/2024) for Recheck.    Patient/family had no further questions at this time and verbalized understanding of the plan discussed today.

## 2024-04-16 ENCOUNTER — TELEPHONE (OUTPATIENT)
Dept: INTERNAL MEDICINE | Facility: CLINIC | Age: 69
End: 2024-04-16
Payer: COMMERCIAL

## 2024-04-16 LAB
CHOLEST SERPL-MCNC: 173 MG/DL (ref 0–200)
HBA1C MFR BLD: 6.5 % (ref 4.8–5.6)
HDLC SERPL-MCNC: 54 MG/DL (ref 40–60)
LDLC SERPL CALC-MCNC: 91 MG/DL (ref 0–100)
TRIGL SERPL-MCNC: 160 MG/DL (ref 0–150)
VLDLC SERPL CALC-MCNC: 28 MG/DL (ref 5–40)

## 2024-04-16 NOTE — TELEPHONE ENCOUNTER
Caller: Selene Holley    Relationship: Self    Best call back number: [phone]    What is the best time to reach you: ANYTIME     Who are you requesting to speak with (clinical staff, provider,  specific staff member): CLINICAL STAFF     Do you know the name of the person who called: SELF     What was the call regarding: PATIENT CALLED AND STATED TO SEE IF THE PAPER WORK FOR THE [employer] IS READY FOR . PLEASE CALL    Is it okay if the provider responds through MyChart: YES

## 2024-04-26 DIAGNOSIS — F41.9 ANXIETY: ICD-10-CM

## 2024-04-26 NOTE — TELEPHONE ENCOUNTER
Caller: Selene Holley    Relationship: Self    Best call back number: [phone]     Requested Prescriptions:   Requested Prescriptions     Pending Prescriptions Disp Refills    diazePAM (VALIUM) 2 MG tablet 20 tablet 0     Sig: Take 1-2 tablets by mouth Daily As Needed for Anxiety.        Pharmacy where request should be sent: [facility]ARMACY #[id number] - [place], [place] - [address] AT [address] - [phone] [facility] [phone]      Last office visit with prescribing clinician: 4/9/2024   Last telemedicine visit with prescribing clinician: Visit date not found   Next office visit with prescribing clinician: 5/8/2024     Additional details provided by patient:     Does the patient have less than a 3 day supply:  [x] Yes  [] No    Would you like a call back once the refill request has been completed: [x] Yes [] No    If the office needs to give you a call back, can they leave a voicemail: [x] Yes [] No    Ramirez Miguel Rep   04/26/24 13:56 EDT

## 2024-04-28 RX ORDER — DIAZEPAM 2 MG/1
2-4 TABLET ORAL DAILY PRN
Qty: 20 TABLET | Refills: 0 | Status: SHIPPED | OUTPATIENT
Start: 2024-04-28

## 2024-04-30 ENCOUNTER — OFFICE VISIT (OUTPATIENT)
Dept: CARDIOLOGY | Facility: CLINIC | Age: 69
End: 2024-04-30
Payer: COMMERCIAL

## 2024-04-30 VITALS
SYSTOLIC BLOOD PRESSURE: 130 MMHG | BODY MASS INDEX: 35.31 KG/M2 | HEIGHT: 68 IN | WEIGHT: 233 LBS | OXYGEN SATURATION: 98 % | HEART RATE: 55 BPM | DIASTOLIC BLOOD PRESSURE: 82 MMHG

## 2024-04-30 DIAGNOSIS — E78.2 MIXED HYPERLIPIDEMIA: ICD-10-CM

## 2024-04-30 DIAGNOSIS — I10 ESSENTIAL HYPERTENSION: Primary | ICD-10-CM

## 2024-04-30 DIAGNOSIS — R94.39 ABNORMAL STRESS ECG WITH TREADMILL: ICD-10-CM

## 2024-04-30 PROCEDURE — 99214 OFFICE O/P EST MOD 30 MIN: CPT | Performed by: INTERNAL MEDICINE

## 2024-04-30 NOTE — PROGRESS NOTES
PATIENTINFORMATION    Date of Office Visit: 2024  Encounter Provider: Earl Kruger MD  Place of Service: [facility] CARDIOLOGY  Patient Name: Selene Holley  : 1955    Subjective:     Encounter Date:2024      Patient ID: Selene Holley is a 69 y.o. female.    No chief complaint on file.    HPI  Ms. Holley is a pleasant 69 years old lady who came to cardiology clinic for follow-up visit.  She has chronic shortness of breath but no significant changes since after last coronary angiogram.  She denies any rest or exertional chest discomfort.  She is compliant with her medications without any side effects.  She does not check blood pressure at home.      ROS  All systems reviewed and negative except as noted in HPI.    Past Medical History:   Diagnosis Date    Anxiety and depression     Cataracts, bilateral     Coronary artery calcification     Diabetic peripheral neuropathy     DM (diabetes mellitus), type 2     Gastric ulcer     Hyperlipidemia     Matta's neuroma of both feet     Overactive bladder     Pancreatitis     GALLBLADDER PANCREATITIS    Peripheral edema     Sleep apnea     CPAP    Trouble swallowing     Urethral granuloma        Past Surgical History:   Procedure Laterality Date    CARDIAC CATHETERIZATION N/A 2024    Procedure: Left Heart Cath;  Surgeon: Piotr Wells MD;  Location:  [facility];  Service: Cardiovascular;  Laterality: N/A;    CARDIAC CATHETERIZATION N/A 2024    Procedure: Coronary angiography;  Surgeon: Piotr Wells MD;  Location:  [facility];  Service: Cardiovascular;  Laterality: N/A;    CATARACT EXTRACTION W/ INTRAOCULAR LENS  IMPLANT, BILATERAL  2023    CHOLECYSTECTOMY      COLONOSCOPY  2018    CLEAR/Q10 YEARS    CYST REMOVAL      LEFT THIGH;41 YEARS AGO    ENDOSCOPY N/A 2023    Procedure: ESOPHAGOGASTRODUODENOSCOPY with biopsy;  Surgeon: Piotr Bustillo MD;   "Location: [facility] ENDOSCOPY;  Service: Gastroenterology;  Laterality: N/A;  PRE - abnormal ct  POST - gastritis, gastric ulcers    MOUTH SURGERY      periodontal    MYOMECTOMY  2002    TONSILLECTOMY AND ADENOIDECTOMY      TOTAL ABDOMINAL HYSTERECTOMY       2 UTERINE FIBROIDS    TOTAL KNEE ARTHROPLASTY Right 2017       Social History     Socioeconomic History    Marital status:    Tobacco Use    Smoking status: Former     Current packs/day: 0.00     Average packs/day: 1 pack/day for 35.0 years (35.0 ttl pk-yrs)     Types: Cigarettes     Start date:      Quit date:      Years since quittin.3    Smokeless tobacco: Never   Vaping Use    Vaping status: Never Used   Substance and Sexual Activity    Alcohol use: Yes     Alcohol/week: 2.0 - 3.0 standard drinks of alcohol     Types: 2 - 3 Standard drinks or equivalent per week     Comment: social on weekend    Drug use: Never    Sexual activity: Defer       Family History   Problem Relation Age of Onset    Polycystic kidney disease Mother     No Known Problems Father     Breast cancer Sister         LATE 50'S    Hypertension Sister     Diabetes Sister     Hypertension Sister     Polycystic kidney disease Brother     Diabetes Brother     Polycystic kidney disease Brother     Kidney disease Brother         s/p transplant    Diabetes Brother     Hyperlipidemia Daughter     Other Daughter         FATTY LIVER     Diabetes Maternal Aunt     Malig Hyperthermia Neg Hx          Procedures       Objective:     /82   Pulse 55   Ht 172.7 cm (68\")   Wt 106 kg (233 lb)   SpO2 98%   BMI 35.43 kg/m²  Body mass index is 35.43 kg/m².     Constitutional:       General: Not in acute distress.     Appearance: Well-developed. Not diaphoretic.   Eyes:      Pupils: Pupils are equal, round, and reactive to light.   HENT:      Head: Normocephalic and atraumatic.   Neck:      Thyroid: No thyromegaly.   Pulmonary:      Effort: Pulmonary effort is normal. No " respiratory distress.      Breath sounds: Normal breath sounds. No wheezing. No rales.   Chest:      Chest wall: Not tender to palpatation.   Cardiovascular:      Normal rate. Regular rhythm.      No gallop.    Pulses:     Intact distal pulses.   Edema:     Peripheral edema absent.   Abdominal:      General: Bowel sounds are normal. There is no distension.      Palpations: Abdomen is soft.      Tenderness: There is no guarding.   Musculoskeletal: Normal range of motion.         General: No deformity.      Cervical back: Normal range of motion and neck supple. Skin:     General: Skin is warm and dry.      Findings: No rash.   Neurological:      Mental Status: Alert and oriented to person, place, and time.      Cranial Nerves: No cranial nerve deficit.      Deep Tendon Reflexes: Reflexes are normal and symmetric.   Psychiatric:         Judgment: Judgment normal.         Review Of Data: I have reviewed pertinent recent labs, images and documents and pertinent findings included in HPI or assessment below.    Lipid Panel          1/8/2024    09:08 4/15/2024    10:00   Lipid Panel   Total Cholesterol 165  173    Triglycerides 150  160    HDL Cholesterol 45  54    VLDL Cholesterol 26  28    LDL Cholesterol  94  91          Assessment/Plan:       Shortness of breath wheeze with abnormal stress ECG with treadmill-diffuse horizontal 1-2 mm ST segment depression noted just after first minute of exercise.  Did not completely return to baseline 4 minutes into recovery.  Appropriate blood pressure response.  She had shortness of breath but no chest discomfort.  Because of concerns of ischemic EKG changes she had coronary angiogram that only showed mild disease of LAD and RCA   In January 2024.  This could be from microvascular angina.  On medical therapy with aspirin, statin and beta-blocker.  Essential hypertension-fairly controlled with metoprolol  Hypercholesterolemia on statin-LDL near goal, mildly elevated triglyceride  levels.  Type II DM that is fairly controlled  Obesity-she is good candidate for GLP-1 agonist that I defer to Dr. Garcia.    Chronic shortness of breath likely multifactorial including from deconditioning.  She is determined to start swimming regularly and also ride a bike.  Continue current care  Follow-up in 1 year or sooner with any concerning symptoms.  Diagnosis and plan of care discussed with patient and verbalized understanding.            Your medication list            Accurate as of April 30, 2024  4:02 PM. If you have any questions, ask your nurse or doctor.                CONTINUE taking these medications        Instructions Last Dose Given Next Dose Due   aspirin 81 MG EC tablet      Take 1 tablet by mouth Daily.       atorvastatin 80 MG tablet  Commonly known as: Lipitor      Take 1 tablet by mouth Daily.       buPROPion  MG 24 hr tablet  Commonly known as: Wellbutrin XL  Start taking on: April 9, 2024      Take 1 tablet by mouth Daily for 15 days, THEN 2 tablets Every Morning for 15 days.       Denta 5000 Plus 1.1 % cream  Generic drug: Sodium Fluoride      See Admin Instructions.       diazePAM 2 MG tablet  Commonly known as: VALIUM      Take 1-2 tablets by mouth Daily As Needed for Anxiety.       escitalopram 20 MG tablet  Commonly known as: LEXAPRO      TAKE 1 TABLET BY MOUTH EVERY DAY       metFORMIN  MG 24 hr tablet  Commonly known as: GLUCOPHAGE-XR      TAKE 2 TABLETS BY MOUTH DAILY       metoprolol succinate XL 25 MG 24 hr tablet  Commonly known as: TOPROL-XL      Take 0.5 tablets by mouth Daily.       vitamin D3 125 MCG (5000 UT) capsule capsule      Take 1 capsule by mouth Daily.                    Earl Kruger MD  04/30/24  16:02 EDT  It could be due to microvascular angina.

## 2024-05-01 ENCOUNTER — TELEPHONE (OUTPATIENT)
Dept: INTERNAL MEDICINE | Facility: CLINIC | Age: 69
End: 2024-05-01
Payer: COMMERCIAL

## 2024-05-01 NOTE — TELEPHONE ENCOUNTER
Recommend decreasing to one tablet daily as she was previously taking until she seems me next time. If symptoms do not resolve within 24-48 hours she should stop the medication entirely.

## 2024-05-01 NOTE — TELEPHONE ENCOUNTER
Pt returned call to office in regards to recent encounter- Advised pt of Dr. Garcia message and pt understood.

## 2024-05-01 NOTE — TELEPHONE ENCOUNTER
Patient C/o having blurry vision, shaking, dark urine, feel like someone is touching her toes. Symptoms started when she started taking 2 pill of the Wellbutrin.

## 2024-05-08 ENCOUNTER — OFFICE VISIT (OUTPATIENT)
Dept: INTERNAL MEDICINE | Facility: CLINIC | Age: 69
End: 2024-05-08
Payer: COMMERCIAL

## 2024-05-08 VITALS
BODY MASS INDEX: 35.77 KG/M2 | HEIGHT: 68 IN | WEIGHT: 236 LBS | DIASTOLIC BLOOD PRESSURE: 72 MMHG | SYSTOLIC BLOOD PRESSURE: 114 MMHG

## 2024-05-08 DIAGNOSIS — F32.A ANXIETY AND DEPRESSION: Primary | ICD-10-CM

## 2024-05-08 DIAGNOSIS — F41.9 ANXIETY AND DEPRESSION: Primary | ICD-10-CM

## 2024-05-08 DIAGNOSIS — I25.10 CORONARY ARTERY DISEASE INVOLVING NATIVE CORONARY ARTERY OF NATIVE HEART WITHOUT ANGINA PECTORIS: ICD-10-CM

## 2024-05-08 DIAGNOSIS — T88.7XXA MEDICATION SIDE EFFECT: ICD-10-CM

## 2024-05-08 DIAGNOSIS — H93.19 TINNITUS, UNSPECIFIED LATERALITY: ICD-10-CM

## 2024-05-08 DIAGNOSIS — E11.40 TYPE 2 DIABETES MELLITUS WITH DIABETIC NEUROPATHY, WITHOUT LONG-TERM CURRENT USE OF INSULIN: ICD-10-CM

## 2024-05-08 DIAGNOSIS — E78.2 MIXED HYPERLIPIDEMIA: ICD-10-CM

## 2024-05-08 PROCEDURE — 99214 OFFICE O/P EST MOD 30 MIN: CPT | Performed by: STUDENT IN AN ORGANIZED HEALTH CARE EDUCATION/TRAINING PROGRAM

## 2024-06-12 DIAGNOSIS — Z87.891 PERSONAL HISTORY OF NICOTINE DEPENDENCE: Primary | ICD-10-CM

## 2024-07-07 ENCOUNTER — HOSPITAL ENCOUNTER (OUTPATIENT)
Facility: HOSPITAL | Age: 69
Discharge: HOME | End: 2024-07-07
Admitting: STUDENT IN AN ORGANIZED HEALTH CARE EDUCATION/TRAINING PROGRAM
Payer: COMMERCIAL

## 2024-07-07 PROCEDURE — 71271 CT THORAX LUNG CANCER SCR C-: CPT

## 2024-07-11 DIAGNOSIS — I25.10 CORONARY ARTERY CALCIFICATION: ICD-10-CM

## 2024-07-11 DIAGNOSIS — E78.2 MIXED HYPERLIPIDEMIA: ICD-10-CM

## 2024-07-11 DIAGNOSIS — I25.84 CORONARY ARTERY CALCIFICATION: ICD-10-CM

## 2024-07-14 RX ORDER — ATORVASTATIN CALCIUM 80 MG/1
80 TABLET, FILM COATED ORAL DAILY
Qty: 90 TABLET | Refills: 3 | Status: SHIPPED | OUTPATIENT
Start: 2024-07-14

## 2024-07-14 RX ORDER — METFORMIN HYDROCHLORIDE 500 MG/1
1000 TABLET, EXTENDED RELEASE ORAL DAILY
Qty: 180 TABLET | Refills: 2 | Status: SHIPPED | OUTPATIENT
Start: 2024-07-14

## 2024-07-22 RX ORDER — ESCITALOPRAM OXALATE 20 MG/1
20 TABLET ORAL DAILY
Qty: 90 TABLET | Refills: 1 | Status: SHIPPED | OUTPATIENT
Start: 2024-07-22

## 2024-09-11 ENCOUNTER — OFFICE VISIT (OUTPATIENT)
Dept: INTERNAL MEDICINE | Facility: CLINIC | Age: 69
End: 2024-09-11
Payer: COMMERCIAL

## 2024-09-11 VITALS
TEMPERATURE: 97.4 F | BODY MASS INDEX: 35.92 KG/M2 | HEIGHT: 68 IN | WEIGHT: 237 LBS | SYSTOLIC BLOOD PRESSURE: 122 MMHG | DIASTOLIC BLOOD PRESSURE: 80 MMHG | OXYGEN SATURATION: 98 % | HEART RATE: 57 BPM

## 2024-09-11 DIAGNOSIS — E11.40 TYPE 2 DIABETES MELLITUS WITH DIABETIC NEUROPATHY, WITHOUT LONG-TERM CURRENT USE OF INSULIN: ICD-10-CM

## 2024-09-11 DIAGNOSIS — N89.8 VAGINAL ITCHING: ICD-10-CM

## 2024-09-11 DIAGNOSIS — I25.84 CORONARY ARTERY CALCIFICATION: ICD-10-CM

## 2024-09-11 DIAGNOSIS — I25.10 CORONARY ARTERY CALCIFICATION: ICD-10-CM

## 2024-09-11 DIAGNOSIS — E78.2 MIXED HYPERLIPIDEMIA: Primary | ICD-10-CM

## 2024-09-11 DIAGNOSIS — B00.1 RECURRENT COLD SORES: ICD-10-CM

## 2024-09-11 LAB
ALBUMIN SERPL-MCNC: 4.1 G/DL (ref 3.5–5.2)
ALBUMIN/GLOB SERPL: 2.2 G/DL
ALP SERPL-CCNC: 86 U/L (ref 39–117)
ALT SERPL-CCNC: 12 U/L (ref 1–33)
AST SERPL-CCNC: 15 U/L (ref 1–32)
BILIRUB SERPL-MCNC: 0.6 MG/DL (ref 0–1.2)
BUN SERPL-MCNC: 13 MG/DL (ref 8–23)
BUN/CREAT SERPL: 17.3 (ref 7–25)
CALCIUM SERPL-MCNC: 9.1 MG/DL (ref 8.6–10.5)
CHLORIDE SERPL-SCNC: 105 MMOL/L (ref 98–107)
CHOLEST SERPL-MCNC: 168 MG/DL (ref 0–200)
CO2 SERPL-SCNC: 26.7 MMOL/L (ref 22–29)
CREAT SERPL-MCNC: 0.75 MG/DL (ref 0.57–1)
EGFRCR SERPLBLD CKD-EPI 2021: 86.8 ML/MIN/1.73
GLOBULIN SER CALC-MCNC: 1.9 GM/DL
GLUCOSE SERPL-MCNC: 127 MG/DL (ref 65–99)
HBA1C MFR BLD: 6.6 % (ref 4.8–5.6)
HDLC SERPL-MCNC: 55 MG/DL (ref 40–60)
LDLC SERPL CALC-MCNC: 92 MG/DL (ref 0–100)
POTASSIUM SERPL-SCNC: 4.4 MMOL/L (ref 3.5–5.2)
PROT SERPL-MCNC: 6 G/DL (ref 6–8.5)
SODIUM SERPL-SCNC: 142 MMOL/L (ref 136–145)
TRIGL SERPL-MCNC: 118 MG/DL (ref 0–150)
VLDLC SERPL CALC-MCNC: 21 MG/DL (ref 5–40)

## 2024-09-11 PROCEDURE — 99214 OFFICE O/P EST MOD 30 MIN: CPT | Performed by: STUDENT IN AN ORGANIZED HEALTH CARE EDUCATION/TRAINING PROGRAM

## 2024-09-11 RX ORDER — VALACYCLOVIR HYDROCHLORIDE 1 G/1
2000 TABLET, FILM COATED ORAL 2 TIMES DAILY
Qty: 10 TABLET | Refills: 0 | Status: SHIPPED | OUTPATIENT
Start: 2024-09-11

## 2024-09-11 NOTE — PROGRESS NOTES
"  Darell Garcia D.O.  Internal Medicine  [facility]  [address], [address]  [place] [address]  [phone]      Chief Complaint  Fever blister on lips and Anxiety    SUBJECTIVE    History of Present Illness    Selene Holley is a 69 y.o. female who presents to the office today as an established patient that last saw me on 5/8/2024.     Type 2 diabetes: takes metformin ER 1000 mg daily. She doesn't check blood sugar at home. Feels that her diet is pretty good. States her cravings have been down with use of cactus drink in the morning. States she wants to talk about ozempic.   Lab Results   Component Value Date    HGBA1C 6.50 (H) 04/15/2024         HLD/Coronary artery disease: currently taking atorvastatin 80 mg daily and aspirin 81 mg daily. States she doesn't eat fried foods.   Lab Results   Component Value Date    CHOL 199 11/11/2020    CHLPL 173 04/15/2024    TRIG 160 (H) 04/15/2024    HDL 54 04/15/2024    LDL 91 04/15/2024     States she is having an eruption of blisters and pain around the lips. She has used an otc cold sore treatment. States they broke out around 9 days ago. She states she had a flare of cold sore around 7 years ago. She feels like it is healing \"it looks terrible\". Doesn't think new lesions are forming but 4-5 have occurred around different parts of the lips this flare.     .   Allergies   Allergen Reactions    Codeine Other (See Comments)     \"BAD DREAMS\"        Outpatient Medications Marked as Taking for the 9/11/24 encounter (Office Visit) with Darell Garcia, DO   Medication Sig Dispense Refill    aspirin 81 MG EC tablet Take 1 tablet by mouth Daily.      atorvastatin (LIPITOR) 80 MG tablet TAKE 1 TABLET BY MOUTH EVERY DAY 90 tablet 3    Denta 5000 Plus 1.1 % cream See Admin Instructions.      diazePAM (VALIUM) 2 MG tablet Take 1-2 tablets by mouth Daily As Needed for Anxiety. 20 tablet 0    escitalopram (LEXAPRO) 20 MG tablet TAKE 1 TABLET BY MOUTH EVERY DAY 90 " "tablet 1    metFORMIN ER (GLUCOPHAGE-XR) 500 MG 24 hr tablet TAKE 2 TABLETS BY MOUTH EVERY  tablet 2    metoprolol succinate XL (TOPROL-XL) 25 MG 24 hr tablet Take 0.5 tablets by mouth Daily. 90 tablet 3    vitamin D3 125 MCG (5000 UT) capsule capsule Take 1 capsule by mouth Daily.          Past Medical History:   Diagnosis Date    Anxiety and depression     Cataracts, bilateral     Coronary artery calcification     Diabetic peripheral neuropathy     DM (diabetes mellitus), type 2     Gastric ulcer     Hyperlipidemia     Matta's neuroma of both feet     Overactive bladder     Pancreatitis     GALLBLADDER PANCREATITIS    Peripheral edema     Sleep apnea     CPAP    Trouble swallowing     Urethral granuloma        OBJECTIVE    Vital Signs:   /80   Pulse 57   Temp 97.4 °F (36.3 °C) (Infrared)   Ht 172.7 cm (68\")   Wt 108 kg (237 lb)   SpO2 98%   BMI 36.04 kg/m²        Physical Exam  Vitals reviewed.   Constitutional:       General: She is not in acute distress.     Appearance: She is obese. She is not ill-appearing.   HENT:      Mouth/Throat:     Eyes:      General: No scleral icterus.  Pulmonary:      Effort: Pulmonary effort is normal. No respiratory distress.   Skin:     Coloration: Skin is not jaundiced.   Neurological:      Mental Status: She is alert.   Psychiatric:         Mood and Affect: Mood normal.         Behavior: Behavior normal.         Thought Content: Thought content normal.                             ASSESSMENT & PLAN     Diagnoses and all orders for this visit:    1. Mixed hyperlipidemia (Primary)  2. Coronary artery calcification  -currently taking atorvastatin 80 mg daily and aspirin 81 mg daily. States she doesn't eat fried foods.   Lab Results   Component Value Date    CHOL 199 11/11/2020    CHLPL 173 04/15/2024    TRIG 160 (H) 04/15/2024    HDL 54 04/15/2024    LDL 91 04/15/2024   -LDL goal less than 70 at least.  We discussed the options of increasing her lipid regimen " versus focusing on weight loss in the setting of diabetes with a GLP-1 receptor agonist and she was more interested in weight loss.  If she is able to achieve substantial weight loss this will improve her lipid profile more naturally.    3. Type 2 diabetes mellitus with diabetic neuropathy, without long-term current use of insulin  -A1c trends on file for this patient:   A1C Last 3 Results          1/8/2024    09:08 4/15/2024    10:00   HGBA1C Last 3 Results   Hemoglobin A1C 6.7  6.50      -Goal A1c for this patient is less than 6.5%  -Current diabetes regimen:takes metformin ER 1000 mg daily. She doesn't check blood sugar at home. Feels that her diet is pretty good. States her cravings have been down with use of cactus drink in the morning. States she wants to talk about ozempic.   -Changes made to diabetes regimen today: recheck A1c today. Previously good control.  BMI 36, weight 237 pounds.  We discussed the risk and benefits of GLP-1 RA medications as below.  After hearing the risk and benefit she is agreeable to treatment.  We discussed the use of Mounjaro due to its single use pens as opposed to needing to use needles manually with Ozempic.  She is agreeable to Mounjaro.  -Reviewed contraindications of Mounjaro: Patients with a personal or family history or MTC or patients with MEN 2 or pregnancy  -Discussed the most common side effects , including nausea, abdominal pain, diarrhea, decreased appetite, vomiting, and constipation, low blood sugar  -Advised patient to report severe abdominal pain   -Counseled patient regarding the potential risk of MTC with use of Mounjaro   -Counseled patient on strategies to limit nausea with Mounjaro:    *Eat bland, low-fat foods, like crackers, toast, and rice   *Eat foods that contain water, like soups and gelatin   *Avoid lying down after they eat  -Will begin Mounjaro titration with 2.5 mg weekly first, she can call in monthly if he is doing well and ready to move up to  next dosage    4. Recurrent cold sores  -Antiviral treatment is likely not going to be effective due to the duration of her flare and that no further lesions are occurring.  I will prescribe Valtrex for the future if a flare recurs.  -     valACYclovir (Valtrex) 1000 MG tablet; Take 2 tablets by mouth 2 (Two) Times a Day. FOR ONE DAY PER COLD SORE FLARE.  Dispense: 10 tablet; Refill: 0    5. Vaginal itching  -defer eval to GYN given that she has not had a routine GYN exam  -     Ambulatory Referral to Gynecology            Follow Up  Return in about 3 months (around 12/11/2024) for Recheck.    Patient/family had no further questions at this time and verbalized understanding of the plan discussed today.

## 2024-09-13 ENCOUNTER — TELEPHONE (OUTPATIENT)
Dept: INTERNAL MEDICINE | Facility: CLINIC | Age: 69
End: 2024-09-13
Payer: COMMERCIAL

## 2024-09-13 NOTE — TELEPHONE ENCOUNTER
Caller: Selene Holley    Relationship: Self    Best call back number: [phone]    What is the best time to reach you: ANYTIME     Who are you requesting to speak with (clinical staff, provider,  specific staff member): CLINICAL STAFF    Do you know the name of the person who called: PATIENT     What was the call regarding: SHE IS ON MONJAURO AND WOULD LIKE TO KNOW IF SHE IS OK TO HAVE A FEW DRINKS WHILE ON IT. SHE STATES THAT SHE ONLY HAS 2 ON FRIDAY AND SATURDAYS.     Is it okay if the provider responds through [place]: NO

## 2024-10-04 ENCOUNTER — TELEPHONE (OUTPATIENT)
Dept: INTERNAL MEDICINE | Facility: CLINIC | Age: 69
End: 2024-10-04
Payer: COMMERCIAL

## 2024-10-04 DIAGNOSIS — E11.40 TYPE 2 DIABETES MELLITUS WITH DIABETIC NEUROPATHY, WITHOUT LONG-TERM CURRENT USE OF INSULIN: Primary | ICD-10-CM

## 2024-10-04 NOTE — TELEPHONE ENCOUNTER
Caller: Selene Holley    Relationship: Self    Best call back number:     [phone]        Which medication are you concerned about: BRITTANY     Who prescribed you this medication: DR QUIROS    What are your concerns:     PATIENT IS WANTING TO KNOW WHAT IS THE NEXT STEP.  SHE TOOK HER FIRST MONTH OF THE MEDICATION BUT DOES NOT HAVE A REFILL ON THE MEDICATION.      DOES IT GO UP TO THE NEXT DOSAGE OR DOES SHE NEED A REFILL OF THE SAME DOSAGE.      [facility]/pharmacy #[id number] - [place], [place] - [address] AT [facility] - [phone] [facility] [phone] FX       PLEASE ADVISE

## 2024-10-04 NOTE — TELEPHONE ENCOUNTER
It goes up monthly for around 5 -6 months until she hits the max dosage or until she has side effects that become intolerable.     I have sent her next dosage strength to her pharmacy.

## 2024-10-07 DIAGNOSIS — E11.40 TYPE 2 DIABETES MELLITUS WITH DIABETIC NEUROPATHY, WITHOUT LONG-TERM CURRENT USE OF INSULIN: ICD-10-CM

## 2024-10-07 RX ORDER — TIRZEPATIDE 2.5 MG/.5ML
INJECTION, SOLUTION SUBCUTANEOUS
OUTPATIENT
Start: 2024-10-07

## 2024-10-18 ENCOUNTER — TELEPHONE (OUTPATIENT)
Dept: INTERNAL MEDICINE | Facility: CLINIC | Age: 69
End: 2024-10-18

## 2024-10-18 NOTE — TELEPHONE ENCOUNTER
Caller: Selene Holley    Relationship: Self    Best call back number : [phone]         What was the call regarding: PATIENT IS ON MOUNJARO AND IS HAVING INCREASED CONSTIPATION. IS THERE SOMETHING DR. QUIROS CAN SUGGEST EITHER OTC OR PRESCRIBED ? PLEASE CALL AND ADVISE    [id number] - [place], [place] - [address] AT [facility] - [phone]  - [phone]  [phone]

## 2024-11-01 DIAGNOSIS — E11.40 TYPE 2 DIABETES MELLITUS WITH DIABETIC NEUROPATHY, WITHOUT LONG-TERM CURRENT USE OF INSULIN: ICD-10-CM

## 2024-11-05 ENCOUNTER — TELEPHONE (OUTPATIENT)
Dept: INTERNAL MEDICINE | Facility: CLINIC | Age: 69
End: 2024-11-05
Payer: COMMERCIAL

## 2024-11-05 NOTE — TELEPHONE ENCOUNTER
Pt returning Dennies' phone call- Advised pt of Dr. Garcia message about possibly increasing dosage of Mounjaro- Pt states she was unsure and would like Dennies to give her a call back tomorrow when she's in office please.

## 2024-11-06 RX ORDER — TIRZEPATIDE 5 MG/.5ML
INJECTION, SOLUTION SUBCUTANEOUS
Qty: 2 ML | Refills: 0 | Status: SHIPPED | OUTPATIENT
Start: 2024-11-06

## 2024-11-06 NOTE — TELEPHONE ENCOUNTER
Called and spoke with patient.  Went over all next doses with her.   She would like to stay on the 5mg, and possibly go up next month.

## 2024-11-06 NOTE — TELEPHONE ENCOUNTER
Patient called back to check on message sent previously sent. Please call patient with an update ASAP.

## 2024-12-02 DIAGNOSIS — E11.40 TYPE 2 DIABETES MELLITUS WITH DIABETIC NEUROPATHY, WITHOUT LONG-TERM CURRENT USE OF INSULIN: ICD-10-CM

## 2024-12-02 NOTE — TELEPHONE ENCOUNTER
Caller: Selene Holley    Relationship to patient: Self    Best call back number: [phone]    Patient is needing: PATIENT WOULD LIKE TO GO UP TO THE NEXT DOSE.

## 2024-12-09 DIAGNOSIS — F41.9 ANXIETY: ICD-10-CM

## 2024-12-10 RX ORDER — DIAZEPAM 2 MG/1
2-4 TABLET ORAL DAILY PRN
Qty: 20 TABLET | Refills: 0 | Status: SHIPPED | OUTPATIENT
Start: 2024-12-10

## 2024-12-19 ENCOUNTER — OFFICE VISIT (OUTPATIENT)
Dept: INTERNAL MEDICINE | Facility: CLINIC | Age: 69
End: 2024-12-19
Payer: COMMERCIAL

## 2024-12-19 VITALS
BODY MASS INDEX: 33.04 KG/M2 | OXYGEN SATURATION: 97 % | SYSTOLIC BLOOD PRESSURE: 118 MMHG | DIASTOLIC BLOOD PRESSURE: 60 MMHG | TEMPERATURE: 97.7 F | WEIGHT: 218 LBS | HEIGHT: 68 IN | HEART RATE: 68 BPM

## 2024-12-19 DIAGNOSIS — E11.40 TYPE 2 DIABETES MELLITUS WITH DIABETIC NEUROPATHY, WITHOUT LONG-TERM CURRENT USE OF INSULIN: Primary | ICD-10-CM

## 2024-12-19 DIAGNOSIS — F32.A ANXIETY AND DEPRESSION: ICD-10-CM

## 2024-12-19 DIAGNOSIS — F41.9 ANXIETY AND DEPRESSION: ICD-10-CM

## 2024-12-19 DIAGNOSIS — B00.1 RECURRENT COLD SORES: ICD-10-CM

## 2024-12-19 PROCEDURE — 99214 OFFICE O/P EST MOD 30 MIN: CPT | Performed by: STUDENT IN AN ORGANIZED HEALTH CARE EDUCATION/TRAINING PROGRAM

## 2024-12-19 NOTE — PROGRESS NOTES
"    Chief Complaint  Diabetes follow up    SUBJECTIVE    History of Present Illness    Selene Holley is a 69 y.o. female who presents to the office today as an established patient that last saw me on 9/11/2024.     Type 2 diabetes: takes metformin ER 1000 mg daily and at last visit started Mounjaro and is at 7.5 mg daily. States she had to repeat this dosage due to constipation. She is drinking more water and using miralax as needed which helps. She doesn't check blood sugar at home. She has lost 20 lbs. Feels she can take a shower now and not be out of breath. She states the medication is decreasing cravings and \"you can't eat even if you wanted to\". Exercise is pool and using the bike in her bedroom, she estimates 2 times weekly and she is trying to improve that. She doesn't have diabetic nerve pain in the feet , just numbness.   Lab Results   Component Value Date    HGBA1C 6.60 (H) 09/11/2024       Anxiety and Depression: Currently taking escitalopram 20 mg daily for anxiety and depression and uses Valium a few times per month.  Unable to tolerate bupropion XL  due to side effects . States her  is now out of the house and her mood is better now and she feels this dosage is ok.   Lab Results   Component Value Date    CHOL 199 11/11/2020    CHLPL 168 09/11/2024    TRIG 118 09/11/2024    HDL 55 09/11/2024    LDL 92 09/11/2024       Allergies   Allergen Reactions    Codeine Other (See Comments)     \"BAD DREAMS\"        Outpatient Medications Marked as Taking for the 12/19/24 encounter (Office Visit) with Darell Garcia,    Medication Sig Dispense Refill    aspirin 81 MG EC tablet Take 1 tablet by mouth Daily.      atorvastatin (LIPITOR) 80 MG tablet TAKE 1 TABLET BY MOUTH EVERY DAY 90 tablet 3    Denta 5000 Plus 1.1 % cream See Admin Instructions.      diazePAM (VALIUM) 2 MG tablet TAKE 1-2 TABLETS BY MOUTH DAILY AS NEEDED FOR ANXIETY. 20 tablet 0    escitalopram (LEXAPRO) 20 MG tablet TAKE 1 TABLET BY MOUTH " "EVERY DAY 90 tablet 1    metFORMIN ER (GLUCOPHAGE-XR) 500 MG 24 hr tablet TAKE 2 TABLETS BY MOUTH EVERY  tablet 2    metoprolol succinate XL (TOPROL-XL) 25 MG 24 hr tablet Take 0.5 tablets by mouth Daily. 90 tablet 3    valACYclovir (Valtrex) 1000 MG tablet Take 2 tablets by mouth 2 (Two) Times a Day. FOR ONE DAY PER COLD SORE FLARE. 10 tablet 0    vitamin D3 125 MCG (5000 UT) capsule capsule Take 1 capsule by mouth Daily.      [DISCONTINUED] Tirzepatide 7.5 MG/0.5ML solution auto-injector Inject 7.5 mg under the skin into the appropriate area as directed 1 (One) Time Per Week. 2 mL 0        Past Medical History:   Diagnosis Date    Anxiety and depression     Cataracts, bilateral     Coronary artery calcification     Diabetic peripheral neuropathy     DM (diabetes mellitus), type 2     Gastric ulcer     Hyperlipidemia     Matta's neuroma of both feet     Overactive bladder     Pancreatitis     GALLBLADDER PANCREATITIS    Peripheral edema     Sleep apnea     CPAP    Trouble swallowing     Urethral granuloma        OBJECTIVE    Vital Signs:   /60   Pulse 68   Temp 97.7 °F (36.5 °C) (Infrared)   Ht 172.7 cm (68\")   Wt 98.9 kg (218 lb)   SpO2 97%   BMI 33.15 kg/m²        Physical Exam  Vitals reviewed.   Constitutional:       General: She is not in acute distress.     Appearance: She is obese. She is not ill-appearing.   Eyes:      General: No scleral icterus.  Pulmonary:      Effort: Pulmonary effort is normal. No respiratory distress.   Skin:     Coloration: Skin is not jaundiced.   Neurological:      Mental Status: She is alert.   Psychiatric:         Mood and Affect: Mood normal.         Behavior: Behavior normal.         Thought Content: Thought content normal.                             ASSESSMENT & PLAN     Diagnoses and all orders for this visit:    1. Type 2 diabetes mellitus with diabetic neuropathy, without long-term current use of insulin (Primary)  -A1c trends on file for this patient: " "  A1C Last 3 Results          1/8/2024    09:08 4/15/2024    10:00 9/11/2024    10:38   HGBA1C Last 3 Results   Hemoglobin A1C 6.7  6.50  6.60      -Goal A1c for this patient is less than 6.5%  -Current diabetes regimen:takes metformin ER 1000 mg daily and at last visit started Mounjaro and is at 7.5 mg daily. States she had to repeat this dosage due to constipation. She is drinking more water and using miralax as needed which helps. She doesn't check blood sugar at home. She has lost 20 lbs. Feels she can take a shower now and not be out of breath. She states the medication is decreasing cravings and \"you can't eat even if you wanted to\". Exercise is pool and using the bike in her bedroom, she estimates 2 times weekly and she is trying to improve that. She doesn't have diabetic nerve pain in the feet , just numbness.   -Changes made to diabetes regimen today: recheck A1c today. She has lost nearly 20 lbs since her last visit (237 lbs 9/11/24 to 218 lbs today). Great news. Continue to increase exercise to help with additional weight loss. Side effects minor. She wants to increase dosage to attempt to get additional weight loss. Will need to monitor side effects closely, but will increase to Mounjaro 10 mg weekly.   -Diabetic kidney disease screening: due for annual screening, will place order today  -check CMP    2. Anxiety and depression        -Currently taking escitalopram 20 mg daily for anxiety and depression and uses Valium a few times per month.  Unable to tolerate bupropion XL  due to side effects . States her  is now out of the house and her mood is better now and she feels this dosage is ok. .         -UDS up to date. SHAHEEN reviewed and is appropriate. Will update controlled substance agreement today.        -continue current regimen           Follow Up  Return in about 3 months (around 3/19/2025) for Annual physical.    Patient/family had no further questions at this time and verbalized " understanding of the plan discussed today.

## 2024-12-20 LAB
ALBUMIN SERPL-MCNC: 3.9 G/DL (ref 3.5–5.2)
ALBUMIN/CREAT UR: 4 MG/G CREAT (ref 0–29)
ALBUMIN/GLOB SERPL: 1.7 G/DL
ALP SERPL-CCNC: 77 U/L (ref 39–117)
ALT SERPL-CCNC: 13 U/L (ref 1–33)
AST SERPL-CCNC: 11 U/L (ref 1–32)
BILIRUB SERPL-MCNC: 0.7 MG/DL (ref 0–1.2)
BUN SERPL-MCNC: 10 MG/DL (ref 8–23)
BUN/CREAT SERPL: 11.8 (ref 7–25)
CALCIUM SERPL-MCNC: 9.4 MG/DL (ref 8.6–10.5)
CHLORIDE SERPL-SCNC: 103 MMOL/L (ref 98–107)
CO2 SERPL-SCNC: 25.5 MMOL/L (ref 22–29)
CREAT SERPL-MCNC: 0.85 MG/DL (ref 0.57–1)
CREAT UR-MCNC: 129.2 MG/DL
EGFRCR SERPLBLD CKD-EPI 2021: 74.7 ML/MIN/1.73
GLOBULIN SER CALC-MCNC: 2.3 GM/DL
GLUCOSE SERPL-MCNC: 110 MG/DL (ref 65–99)
HBA1C MFR BLD: 5.8 % (ref 4.8–5.6)
MICROALBUMIN UR-MCNC: 4.6 UG/ML
POTASSIUM SERPL-SCNC: 4.5 MMOL/L (ref 3.5–5.2)
PROT SERPL-MCNC: 6.2 G/DL (ref 6–8.5)
SODIUM SERPL-SCNC: 140 MMOL/L (ref 136–145)

## 2024-12-20 RX ORDER — VALACYCLOVIR HYDROCHLORIDE 1 G/1
2000 TABLET, FILM COATED ORAL 2 TIMES DAILY
Qty: 10 TABLET | Refills: 0 | Status: SHIPPED | OUTPATIENT
Start: 2024-12-20

## 2025-01-01 DIAGNOSIS — E11.40 TYPE 2 DIABETES MELLITUS WITH DIABETIC NEUROPATHY, WITHOUT LONG-TERM CURRENT USE OF INSULIN: ICD-10-CM

## 2025-01-27 RX ORDER — ESCITALOPRAM OXALATE 20 MG/1
20 TABLET ORAL DAILY
Qty: 90 TABLET | Refills: 1 | Status: SHIPPED | OUTPATIENT
Start: 2025-01-27

## 2025-01-28 DIAGNOSIS — E11.40 TYPE 2 DIABETES MELLITUS WITH DIABETIC NEUROPATHY, WITHOUT LONG-TERM CURRENT USE OF INSULIN: ICD-10-CM

## 2025-01-28 RX ORDER — TIRZEPATIDE 10 MG/.5ML
INJECTION, SOLUTION SUBCUTANEOUS
OUTPATIENT
Start: 2025-01-28

## 2025-01-28 NOTE — TELEPHONE ENCOUNTER
Caller: Selene Holley    Relationship: Self    Best call back number: [phone]     Requested Prescriptions:   Requested Prescriptions     Pending Prescriptions Disp Refills    Tirzepatide 10 MG/0.5ML solution auto-injector 2 mL 0     Sig: Inject 10 mg under the skin into the appropriate area as directed 1 (One) Time Per Week.        Pharmacy where request should be sent: [id number] - [place], [place] - [address] AT [facility] [phone] [facility] [phone] FX     Last office visit with prescribing clinician: 12/19/2024   Last telemedicine visit with prescribing clinician: Visit date not found   Next office visit with prescribing clinician: 3/24/2025     Additional details provided by patient: PATIENT DOES NOT WANT TO GO UP TO THE NEXT DOSE, WANTS TO STAY AT 10MG  REASON: STILL HAVING TROUBLE WITH CONSTIPATION    Does the patient have less than a 3 day supply:  [x] Yes  [] No      Ramirez Anand Rep   01/28/25 09:11 EST

## 2025-02-11 RX ORDER — METOPROLOL SUCCINATE 25 MG/1
12.5 TABLET, EXTENDED RELEASE ORAL DAILY
Qty: 45 TABLET | Refills: 0 | Status: SHIPPED | OUTPATIENT
Start: 2025-02-11

## 2025-02-21 DIAGNOSIS — B00.1 RECURRENT COLD SORES: ICD-10-CM

## 2025-02-21 RX ORDER — VALACYCLOVIR HYDROCHLORIDE 1 G/1
2000 TABLET, FILM COATED ORAL 2 TIMES DAILY
Qty: 10 TABLET | Refills: 0 | Status: SHIPPED | OUTPATIENT
Start: 2025-02-21

## 2025-02-26 ENCOUNTER — TELEPHONE (OUTPATIENT)
Dept: INTERNAL MEDICINE | Facility: CLINIC | Age: 70
End: 2025-02-26
Payer: COMMERCIAL

## 2025-02-26 DIAGNOSIS — E11.40 TYPE 2 DIABETES MELLITUS WITH DIABETIC NEUROPATHY, WITHOUT LONG-TERM CURRENT USE OF INSULIN: Primary | ICD-10-CM

## 2025-02-26 NOTE — TELEPHONE ENCOUNTER
Caller: Selene Holley    Relationship: Self    Best call back number: [phone]    What medication are you requesting: NEXT DOSE OF     Tirzepatide 10 MG/0.5ML solution auto-injector       If a prescription is needed, what is your preferred pharmacy and phone number: [id number] - [place], [place] - [address] AT [facility] - [phone]  - [phone] FX     Additional notes: SHE HAS BEEN ON THE CURRENT DOSE FOR 2 MONTHS AND WOULD LIKE TO GO UP TO THE NEXT DOSE

## 2025-03-11 ENCOUNTER — TELEPHONE (OUTPATIENT)
Dept: INTERNAL MEDICINE | Facility: CLINIC | Age: 70
End: 2025-03-11
Payer: COMMERCIAL

## 2025-03-11 DIAGNOSIS — Z12.31 ENCOUNTER FOR SCREENING MAMMOGRAM FOR MALIGNANT NEOPLASM OF BREAST: Primary | ICD-10-CM

## 2025-03-11 NOTE — TELEPHONE ENCOUNTER
Bry narayan in regards to mutual pt- They are needing a routine mammogram order faxed over for pt who has an appt with them tomorrow, 3/12/2025.    Fax # [phone]

## 2025-03-24 ENCOUNTER — OFFICE VISIT (OUTPATIENT)
Dept: INTERNAL MEDICINE | Facility: CLINIC | Age: 70
End: 2025-03-24
Payer: COMMERCIAL

## 2025-03-24 VITALS
SYSTOLIC BLOOD PRESSURE: 100 MMHG | WEIGHT: 209 LBS | DIASTOLIC BLOOD PRESSURE: 62 MMHG | TEMPERATURE: 97.1 F | HEIGHT: 68 IN | BODY MASS INDEX: 31.67 KG/M2 | OXYGEN SATURATION: 97 % | HEART RATE: 70 BPM

## 2025-03-24 DIAGNOSIS — I25.10 CORONARY ARTERY CALCIFICATION: ICD-10-CM

## 2025-03-24 DIAGNOSIS — E78.2 MIXED HYPERLIPIDEMIA: ICD-10-CM

## 2025-03-24 DIAGNOSIS — E66.811 CLASS 1 OBESITY DUE TO EXCESS CALORIES WITH SERIOUS COMORBIDITY AND BODY MASS INDEX (BMI) OF 31.0 TO 31.9 IN ADULT: ICD-10-CM

## 2025-03-24 DIAGNOSIS — E11.40 TYPE 2 DIABETES MELLITUS WITH DIABETIC NEUROPATHY, WITHOUT LONG-TERM CURRENT USE OF INSULIN: ICD-10-CM

## 2025-03-24 DIAGNOSIS — E66.09 CLASS 1 OBESITY DUE TO EXCESS CALORIES WITH SERIOUS COMORBIDITY AND BODY MASS INDEX (BMI) OF 31.0 TO 31.9 IN ADULT: ICD-10-CM

## 2025-03-24 DIAGNOSIS — Z00.00 ANNUAL PHYSICAL EXAM: Primary | ICD-10-CM

## 2025-03-24 DIAGNOSIS — Z23 NEED FOR TD VACCINE: ICD-10-CM

## 2025-03-24 LAB
BASOPHILS # BLD AUTO: 0.04 10*3/MM3 (ref 0–0.2)
BASOPHILS NFR BLD AUTO: 0.9 % (ref 0–1.5)
CHOLEST SERPL-MCNC: 126 MG/DL (ref 0–200)
EOSINOPHIL # BLD AUTO: 0.11 10*3/MM3 (ref 0–0.4)
EOSINOPHIL NFR BLD AUTO: 2.4 % (ref 0.3–6.2)
ERYTHROCYTE [DISTWIDTH] IN BLOOD BY AUTOMATED COUNT: 13.1 % (ref 12.3–15.4)
HCT VFR BLD AUTO: 37.7 % (ref 34–46.6)
HDLC SERPL-MCNC: 47 MG/DL (ref 40–60)
HGB BLD-MCNC: 12.6 G/DL (ref 12–15.9)
IMM GRANULOCYTES # BLD AUTO: 0.02 10*3/MM3 (ref 0–0.05)
IMM GRANULOCYTES NFR BLD AUTO: 0.4 % (ref 0–0.5)
LDLC SERPL CALC-MCNC: 60 MG/DL (ref 0–100)
LYMPHOCYTES # BLD AUTO: 1.51 10*3/MM3 (ref 0.7–3.1)
LYMPHOCYTES NFR BLD AUTO: 33.4 % (ref 19.6–45.3)
MCH RBC QN AUTO: 30.3 PG (ref 26.6–33)
MCHC RBC AUTO-ENTMCNC: 33.4 G/DL (ref 31.5–35.7)
MCV RBC AUTO: 90.6 FL (ref 79–97)
MONOCYTES # BLD AUTO: 0.43 10*3/MM3 (ref 0.1–0.9)
MONOCYTES NFR BLD AUTO: 9.5 % (ref 5–12)
NEUTROPHILS # BLD AUTO: 2.41 10*3/MM3 (ref 1.7–7)
NEUTROPHILS NFR BLD AUTO: 53.4 % (ref 42.7–76)
NRBC BLD AUTO-RTO: 0 /100 WBC (ref 0–0.2)
PLATELET # BLD AUTO: 209 10*3/MM3 (ref 140–450)
RBC # BLD AUTO: 4.16 10*6/MM3 (ref 3.77–5.28)
TRIGL SERPL-MCNC: 102 MG/DL (ref 0–150)
VLDLC SERPL CALC-MCNC: 19 MG/DL (ref 5–40)
WBC # BLD AUTO: 4.52 10*3/MM3 (ref 3.4–10.8)

## 2025-03-24 PROCEDURE — 99214 OFFICE O/P EST MOD 30 MIN: CPT | Performed by: STUDENT IN AN ORGANIZED HEALTH CARE EDUCATION/TRAINING PROGRAM

## 2025-03-24 PROCEDURE — 90471 IMMUNIZATION ADMIN: CPT | Performed by: STUDENT IN AN ORGANIZED HEALTH CARE EDUCATION/TRAINING PROGRAM

## 2025-03-24 PROCEDURE — 99397 PER PM REEVAL EST PAT 65+ YR: CPT | Performed by: STUDENT IN AN ORGANIZED HEALTH CARE EDUCATION/TRAINING PROGRAM

## 2025-03-24 PROCEDURE — 90714 TD VACC NO PRESV 7 YRS+ IM: CPT | Performed by: STUDENT IN AN ORGANIZED HEALTH CARE EDUCATION/TRAINING PROGRAM

## 2025-03-24 NOTE — LETTER
[facility]  Vaccine Consent Form    Patient Name:  Selene Holley  Patient :  1955     Vaccine(s) Ordered    Td Vaccine => 9yo PF (Teniva) 5-2        Screening Checklist  The following questions should be completed prior to vaccination. If you answer “yes” to any question, it does not necessarily mean you should not be vaccinated. It just means we may need to clarify or ask more questions. If a question is unclear, please ask your healthcare provider to explain it.    Yes No   Any fever or moderate to severe illness today (mild illness and/or antibiotic treatment are not contraindications)?     Do you have a history of a serious reaction to any previous vaccinations, such as anaphylaxis, encephalopathy within 7 days, Guillain-[place] syndrome within 6 weeks, seizure?     Have you received any live vaccine(s) (e.g MMR, CODY) or any other vaccines in the last month (to ensure duplicate doses aren't given)?     Do you have an anaphylactic allergy to latex (DTaP, DTaP-IPV, Hep A, Hep B, MenB, RV, Td, Tdap), baker’s yeast (Hep B, HPV), polysorbates (RSV, nirsevimab, PCV 20, Rotavirrus, Tdap, Shingrix), or gelatin (CODY, MMR)?     Do you have an anaphylactic allergy to neomycin (Rabies, CODY, MMR, IPV, Hep A), polymyxin B (IPV), or streptomycin (IPV)?      Any cancer, leukemia, AIDS, or other immune system disorder? (CODY, MMR, RV)     Do you have a parent, brother, or sister with an immune system problem (if immune competence of vaccine recipient clinically verified, can proceed)? (MMR, CODY)     Any recent steroid treatments for >2 weeks, chemotherapy, or radiation treatment? (CODY, MMR)     Have you received antibody-containing blood transfusions or IVIG in the past 11 months (recommended interval is dependent on product)? (MMR, CODY)     Have you taken antiviral drugs (acyclovir, famciclovir, valacyclovir for CODY) in the last 24 or 48 hours, respectively?      Are you pregnant or planning to become pregnant within 1  "month? (CODY, MMR, HPV, IPV, MenB, Abrexvy; For Hep B- refer to Engerix-B; For RSV - Abrysvo is indicated for 32-36 weeks of pregnancy from September to January)     For infants, have you ever been told your child has had intussusception or a medical emergency involving obstruction of the intestine (Rotavirus)? If not for an infant, can skip this question.         *Ordering Physicians/APC should be consulted if \"yes\" is checked by the patient or guardian above.  I have received, read, and understand the Vaccine Information Statement (VIS) for each vaccine ordered.  I have considered my or my child's health status as well as the health status of my close contacts.  I have taken the opportunity to discuss my vaccine questions with my or my child's health care provider.   I have requested that the ordered vaccine(s) be given to me or my child.  I understand the benefits and risks of the vaccines.  I understand that I should remain in the clinic for 15 minutes after receiving the vaccine(s).  _________________________________________________________  Signature of Patient or Parent/Legal Guardian ____________________  Date   "

## 2025-03-24 NOTE — PROGRESS NOTES
"  Darell Garcia DO, [facility]  Internal Medicine  [facility]  [address], [address]  [place] [address]  [phone]    Chief Complaint  Annual checkup    HISTORY    Selene Holley is a 70 y.o. female who presents to the office today as a  an established patient for their annual preventative exam.     No hospitalization(s) within the last year.     Current exercise regimen: states she goes to the pool 4 days weekly.     Status of chronic medical conditions:    MARYANNE: states she tries to use CPAP , states she may try an alternative mask.      Gastritis: EGD 9/2023 with negative biopsy. Followed with GI. Completed a  3 month course of PPI. She doesn't use NSAID products routinely.     HTN: cardiology stopped lisinopril 2.5 mg daily and changed regimen to metoprolol 12.5 mg daily. Doesn't check BP at home.      Recurrent cold sores: valtrex as needed    HLD/Coronary artery disease: 1/2024 [facility] \"Minimal coronary artery disease and a left dominant coronary artery circulation. Scattered 20% segment stenoses throughout the proximal and mid LAD and mid circumflex otherwise small nondominant right coronary artery \". Currently taking atorvastatin 80 mg daily and aspirin 81 mg daily. No chest pain or pressure.   Lab Results   Component Value Date    CHOL 199 11/11/2020    CHLPL 168 09/11/2024    TRIG 118 09/11/2024    HDL 55 09/11/2024    LDL 92 09/11/2024       Anxiety and Depression: Currently taking escitalopram 20 mg daily for anxiety and depression and uses Valium a few times per month, only if she really feels like she needs it.  Unable to tolerate bupropion XL  due to side effects . Mood seems OK.     Type 2 diabetes: takes metformin ER 1000 mg daily and  Mounjaro 12.5 mg weekly. States she has to remember to eat regularly. Her weight at home is down to 209-205 lbs. Doesn't check blood sugar at home. In general states her diabetic diet is good. States she makes a lot of vegetables soups. States she " was having constipation issues in the past but not currently. States she is interested in increasing the dosage.  Lab Results   Component Value Date    HGBA1C 5.80 (H) 12/19/2024       Patient's overall comments about their health or any other specific health concerns they report: none    GYN History:      *Patient's GYN Practice: none      *s/p hysterectomy      *Previous pregnancies: 3.  Live births: 4.  Miscarriages: 0 . Elective abortions: 0.      Health Maintenance Summary            Current Care Gaps       DIABETIC FOOT EXAM (Yearly) Overdue since 12/28/2021 12/28/2020  SCANNED - FOOT EXAM              TDAP/TD VACCINES (2 - Td or Tdap) Overdue since 4/21/2024 04/21/2014  Imm Admin: Tdap              BMI FOLLOWUP (Yearly) Overdue since 1/11/2025 01/11/2024  Registry Metric: BMI Follow-up              Pneumococcal Vaccine 50+ (1 of 2 - PCV) Never done     No completion history exists for this topic.              ZOSTER VACCINE (1 of 2) Never done     No completion history exists for this topic.              INFLUENZA VACCINE (Yearly - July to March) Never done     No completion history exists for this topic.              COVID-19 Vaccine (1 - 2024-25 season) Never done     No completion history exists for this topic.              DXA SCAN (Every 2 Years) Due soon on 5/22/2025 05/22/2023  DEXA Bone Density Axial              HEMOGLOBIN A1C (Every 6 Months) Due soon on 6/19/2025 12/19/2024  Hemoglobin A1C component of Hemoglobin A1c    09/11/2024  Hemoglobin A1C component of Hemoglobin A1c    04/15/2024  Hemoglobin A1C component of Hemoglobin A1c    01/08/2024  Hemoglobin A1C component of Hemoglobin A1c    07/27/2023  Hemoglobin A1C component of Hemoglobin A1c     Only the first 5 history entries have been loaded, but more history exists.                    Needs Review       COLORECTAL CANCER SCREENING (COLONOSCOPY - Every 10 Years) Tentatively due on 4/9/2028 04/09/2018  SCANNED -  COLONOSCOPY    04/09/2018  COLONOSCOPY (Done)    02/08/2016  Outside Procedure: CHG BLOOD OCCULT,BY PEROXID,FECES,SINGLE, COLORECTAL SCREEN                      Awaiting Completion       LIPID PANEL (Yearly) Order placed this encounter      03/24/2025  Order placed for Lipid Panel by Darell Garcia DO    09/11/2024  Lipid Panel    04/15/2024  Lipid Panel    01/08/2024  Lipid Panel    04/24/2023  Lipid Panel With LDL/HDL Ratio      Only the first 5 history entries have been loaded, but more history exists.                      Upcoming       LUNG CANCER SCREENING (Yearly) Next due on 7/7/2025 07/07/2024   CT Chest Low Dose Cancer Screening WO    05/22/2023   CT Chest Low Dose Cancer Screening WO              URINE MICROALBUMIN-CREATININE RATIO (uACR) (Yearly) Next due on 12/19/2025 12/19/2024  Microalbumin/Creatinine Ratio component of Microalbumin / Creatinine Urine Ratio - Urine, Clean Catch    04/24/2023  Microalbumin/Creatinine Ratio component of Microalbumin / Creatinine Urine Ratio - Urine, Clean Catch    03/18/2022  Microalbumin/Creatinine Ratio component of Microalbumin / Creatinine Urine Ratio - Urine, Clean Catch    11/11/2020  Microalbumin/Creatinine Ratio component of Microalbumin / Creatinine Urine Ratio - Urine, Clean Catch              DIABETIC EYE EXAM (Yearly) Next due on 2/28/2026 02/28/2025  EYE EXAM SCANNED    02/29/2024  SCANNED - EYE EXAM    11/30/2023  SCANNED - EYE EXAM    08/29/2022  SCANNED - EYE EXAM    03/23/2021  Done      Only the first 5 history entries have been loaded, but more history exists.              ANNUAL PHYSICAL (Yearly) Next due on 3/24/2026      03/24/2025  Done    01/08/2024  Registry Metric: Last Annual Physical    11/03/2022  Done    08/20/2021  Done              MAMMOGRAM (Every 2 Years) Next due on 3/12/2027      03/12/2025  Mammo Screening Digital Tomosynthesis Bilateral With CAD    03/12/2025  MAMMO Scan    03/06/2024  MAMMO SCREENING DIGITAL  "TOMOSYNTHESIS BILATERAL W CAD    03/06/2024  Mammo Screening Digital Tomosynthesis Bilateral With CAD    03/01/2023  Mammo Screening Digital Tomosynthesis Bilateral With CAD      Only the first 5 history entries have been loaded, but more history exists.                      Completed or No Longer Recommended       HEPATITIS C SCREENING  Completed      08/20/2021  Hep C Virus Ab component of Hepatitis C Antibody    10/12/2016  Outside Procedure: CHG HEPATITIS C AB TEST              PAP SMEAR  Discontinued      No completion history exists for this topic.                             Allergies   Allergen Reactions    Codeine Other (See Comments)     \"BAD DREAMS\"        Outpatient Medications Marked as Taking for the 3/24/25 encounter (Office Visit) with Darell Garcia,    Medication Sig Dispense Refill    aspirin 81 MG EC tablet Take 1 tablet by mouth Daily.      atorvastatin (LIPITOR) 80 MG tablet TAKE 1 TABLET BY MOUTH EVERY DAY 90 tablet 3    diazePAM (VALIUM) 2 MG tablet TAKE 1-2 TABLETS BY MOUTH DAILY AS NEEDED FOR ANXIETY. 20 tablet 0    escitalopram (LEXAPRO) 20 MG tablet TAKE 1 TABLET BY MOUTH EVERY DAY 90 tablet 1    metFORMIN ER (GLUCOPHAGE-XR) 500 MG 24 hr tablet TAKE 2 TABLETS BY MOUTH EVERY  tablet 2    metoprolol succinate XL (TOPROL-XL) 25 MG 24 hr tablet TAKE 1/2 TABLET BY MOUTH DAILY 45 tablet 0    valACYclovir (Valtrex) 1000 MG tablet Take 2 tablets by mouth 2 (Two) Times a Day. FOR ONE DAY PER COLD SORE FLARE. 10 tablet 0    vitamin D3 125 MCG (5000 UT) capsule capsule Take 1 capsule by mouth Daily.      [DISCONTINUED] Tirzepatide 12.5 MG/0.5ML solution auto-injector Inject 0.5 mL under the skin into the appropriate area as directed 1 (One) Time Per Week. 2 mL 0        Past Medical History:   Diagnosis Date    Anxiety and depression     Cataracts, bilateral     Coronary artery calcification     Diabetic peripheral neuropathy     DM (diabetes mellitus), type 2     Gastric ulcer     " Hyperlipidemia     Matta's neuroma of both feet     Overactive bladder     Pancreatitis     GALLBLADDER PANCREATITIS    Peripheral edema     Sleep apnea     CPAP    Trouble swallowing     Urethral granuloma      Past Surgical History:   Procedure Laterality Date    CARDIAC CATHETERIZATION N/A 01/26/2024    Procedure: Left Heart Cath;  Surgeon: Piotr Wells MD;  Location:  [facility];  Service: Cardiovascular;  Laterality: N/A;    CARDIAC CATHETERIZATION N/A 01/26/2024    Procedure: Coronary angiography;  Surgeon: Piotr Wells MD;  Location:  [facility];  Service: Cardiovascular;  Laterality: N/A;    CATARACT EXTRACTION W/ INTRAOCULAR LENS  IMPLANT, BILATERAL  2023 October/November    CHOLECYSTECTOMY      COLONOSCOPY  04/2018    CLEAR/Q10 YEARS    CYST REMOVAL      LEFT THIGH;41 YEARS AGO    ENDOSCOPY N/A 09/08/2023    Procedure: ESOPHAGOGASTRODUODENOSCOPY with biopsy;  Surgeon: Piotr Bustillo MD;  Location: [facility] ENDOSCOPY;  Service: Gastroenterology;  Laterality: N/A;  PRE - abnormal ct  POST - gastritis, gastric ulcers    EYE CAPSULOTOMY WITH LASER Left     MOUTH SURGERY      periodontal    MYOMECTOMY  02/2002    TONSILLECTOMY AND ADENOIDECTOMY      TOTAL ABDOMINAL HYSTERECTOMY  2000     2 UTERINE FIBROIDS    TOTAL KNEE ARTHROPLASTY Right 2017     Family History   Problem Relation Age of Onset    Polycystic kidney disease Mother     No Known Problems Father     Breast cancer Sister         LATE 50'S    Hypertension Sister     Diabetes Sister     Hypertension Sister     Polycystic kidney disease Brother     Diabetes Brother     Polycystic kidney disease Brother     Kidney disease Brother         s/p transplant    Diabetes Brother     Hyperlipidemia Daughter     Other Daughter         FATTY LIVER     Diabetes Maternal Aunt     Malbrad Hyperthermia Neg Hx     reports that she quit smoking about 10 years ago. Her smoking use included cigarettes. She started smoking  "about 45 years ago. She has a 35 pack-year smoking history. She has never used smokeless tobacco. She reports current alcohol use of about 2.0 - 3.0 standard drinks of alcohol per week. She reports that she does not use drugs.    Immunization History   Administered Date(s) Administered    Tdap 04/21/2014        OBJECTIVE    Vital Signs:   /62   Pulse 70   Temp 97.1 °F (36.2 °C) (Infrared)   Ht 172.7 cm (68\")   Wt 94.8 kg (209 lb)   SpO2 97%   BMI 31.78 kg/m²     Physical Exam  Vitals reviewed.   Constitutional:       General: She is not in acute distress.     Appearance: Normal appearance. She is obese.   HENT:      Head: Normocephalic and atraumatic.      Right Ear: Tympanic membrane, ear canal and external ear normal. There is no impacted cerumen.      Left Ear: Tympanic membrane, ear canal and external ear normal. There is no impacted cerumen.      Mouth/Throat:      Mouth: Mucous membranes are moist.      Pharynx: No oropharyngeal exudate or posterior oropharyngeal erythema.      Comments: Mallampati class III  Eyes:      General: No scleral icterus.     Extraocular Movements: Extraocular movements intact.      Conjunctiva/sclera: Conjunctivae normal.      Pupils: Pupils are equal, round, and reactive to light.   Cardiovascular:      Rate and Rhythm: Normal rate and regular rhythm.      Heart sounds: Normal heart sounds. No murmur heard.  Pulmonary:      Effort: Pulmonary effort is normal. No respiratory distress.      Breath sounds: Normal breath sounds. No wheezing.   Abdominal:      General: Bowel sounds are normal. There is no distension.      Palpations: Abdomen is soft.      Tenderness: There is no abdominal tenderness. There is no guarding.   Musculoskeletal:      Cervical back: Neck supple.      Right lower leg: No edema.      Left lower leg: No edema.   Lymphadenopathy:      Cervical: No cervical adenopathy.   Skin:     General: Skin is warm and dry.      Coloration: Skin is not jaundiced. "   Neurological:      General: No focal deficit present.      Mental Status: She is alert and oriented to person, place, and time.      Cranial Nerves: No cranial nerve deficit.      Motor: No weakness.   Psychiatric:         Mood and Affect: Mood normal.         Behavior: Behavior normal.         Thought Content: Thought content normal.                         The 10-year ASCVD risk score (Justyn BOX, et al., 2019) is: 12.5%    Values used to calculate the score:      Age: 70 years      Sex: Female      Is Non- : No      Diabetic: Yes      Tobacco smoker: No      Systolic Blood Pressure: 100 mmHg      Is BP treated: Yes      HDL Cholesterol: 55 mg/dL      Total Cholesterol: 168 mg/dL     ASSESSMENT & PLAN     #Annual Preventative Health Examination   -Age and sex appropriate physical exam performed and documented. Updated past medical, family, social and surgical histories as well as allergies and care team list. Addressed care gaps listed in the medical record.  -Encouraged annual dental and vision exams as part of their overall health.  -Encouraged minimum of 30 minutes or more of exercise at a brisk walk or higher 5 days per week combined with a well-balanced diet.  -Immunizations reviewed and updated in EMR. Td, Influenza, Prevnar 20 (Pneumococcal 20-valent conjugate), Shingrix, and COVID19 recommended.  -Lipid screening:   Lipid Panel          4/15/2024    10:00 9/11/2024    10:38   Lipid Panel   Total Cholesterol 173  168    Triglycerides 160  118    HDL Cholesterol 54  55    VLDL Cholesterol 28  21    LDL Cholesterol  91  92     Patient is already on statin therapy.. See plan below  -Aspirin for primary or secondary prevention: on aspirin as indicated  -Depression and Anxiety screening: Patient has known diagnosis of depression and / or anxiety.  -Diabetes screening:  Patient already has a diagnosis of diabetes mellitus and is being treated.   -Tobacco use screening: Conducted and  addressed if indicated.   -Alcohol use screening: Conducted and addressed if indicated.   -Illicit drug screening: Conducted and addressed if indicated.   -Hypertension screening: Patient with known diagnosis of hypertension and is receiving treatment.  -HIV screening: Patient is over age 65, screening not indicated.  -Syphilis screening: Syphilis screening not indicated.  -Hepatitis B virus screening: Screening not indicated, not in a high-risk group.  -Hepatitis C virus screening:  Patient has already completed Hepatitis C screening. Negative screening on file.   -Colon cancer screening: Patient is already up to date on their colon cancer screening with colonoscopy is indicated again in 2028  -Lung cancer screening: Screening is up to date and negative.   -Cervical cancer screening:  s/p hysterectomy   -Breast cancer screening:  Was done approximately 3/2025 and the result was: Birads I (Normal)..  -Osteoporosis screening: normal DEXA 5/2023    Follow up in 1 year for annual physical exam.    Patient/family had no further questions at this time and verbalized understanding of the plan discussed today.     A problem-based visit was also conducted on the same day, see below for assessment and plan    Diagnoses and all orders for this visit:    1. Coronary artery calcification (Primary)  2. Type 2 diabetes mellitus with diabetic neuropathy, without long-term current use of insulin  3. Class 1 obesity due to excess calories with serious comorbidity and body mass index (BMI) of 31.0 to 31.9 in adult  4. Mixed hyperlipidemia  Lab Results   Component Value Date    HGBA1C 5.80 (H) 12/19/2024     -great control of diabetes on current regimen of metformin ER  1000 mg daily and Mounjaro 12.5 mg weekly. Weight is down from 237  lbs 9/11/2024 to 209 lbs today, great news. She has one more dosage increase available on Mounjaro so we will increase to 15 mg weekly today for obesity. Advised her to monitor for signs of hypotension  given her substantial weight loss.  Lab Results   Component Value Date    CHOL 199 11/11/2020    CHLPL 168 09/11/2024    TRIG 118 09/11/2024    HDL 55 09/11/2024    LDL 92 09/11/2024     -last lipid profile as above showed LDL of 92 but should be less than 70 per ADA and cardiology guidelines. Denies chest pain. She follows with [facility]. If LDL still is not at goal we will need to consider add on therapy such as with ezetimibe.           The following social determinates of health impact the patient's medical decision making: No social determinates of health were factored in to today's visit.     Follow Up  Return in about 4 months (around 7/24/2025) for Recheck.

## 2025-04-25 RX ORDER — METFORMIN HYDROCHLORIDE 500 MG/1
1000 TABLET, EXTENDED RELEASE ORAL DAILY
Qty: 180 TABLET | Refills: 2 | Status: SHIPPED | OUTPATIENT
Start: 2025-04-25

## 2025-04-26 DIAGNOSIS — F41.9 ANXIETY: ICD-10-CM

## 2025-04-28 RX ORDER — DIAZEPAM 2 MG/1
2-4 TABLET ORAL DAILY PRN
Qty: 20 TABLET | Refills: 0 | Status: SHIPPED | OUTPATIENT
Start: 2025-04-28

## 2025-05-02 NOTE — PROGRESS NOTES
PCP:  Darell Garcia DO                                                                         ROOM:____________    : 1955  AGE: 70 y.o.    ALLERGIES:Codeine    LAST OV:______________________ LAST EKG:________________ LAST WEIGHT:   Wt Readings from Last 1 Encounters:   25 94.8 kg (209 lb)        BP Readings from Last 3 Encounters:   25 100/62   24 118/60   24 122/80        WT: ____________  BP: __________ HR ______   02% _______      CHEST PAIN: YES OR NO                                           LIGHTHEADED: YES OR NO    SOA: YES OR NO                    FATIGUE: YES OR NO    PALPITATIONS: YES OR NO                                      EDEMA: YES OR NO    SLEEP APNEA: YES OR NO                                     CPAP     OR    BIPAP                                                  SMOKING:   Social History     Socioeconomic History    Marital status: Legally    Tobacco Use    Smoking status: Former     Current packs/day: 0.00     Average packs/day: 1 pack/day for 35.0 years (35.0 ttl pk-yrs)     Types: Cigarettes     Start date:      Quit date:      Years since quitting: 10.3    Smokeless tobacco: Never   Vaping Use    Vaping status: Never Used   Substance and Sexual Activity    Alcohol use: Yes     Alcohol/week: 2.0 - 3.0 standard drinks of alcohol     Types: 2 - 3 Standard drinks or equivalent per week     Comment: social on weekend    Drug use: Never    Sexual activity: Defer

## 2025-05-05 ENCOUNTER — OFFICE VISIT (OUTPATIENT)
Dept: CARDIOLOGY | Age: 70
End: 2025-05-05
Payer: COMMERCIAL

## 2025-05-05 VITALS
HEART RATE: 64 BPM | SYSTOLIC BLOOD PRESSURE: 98 MMHG | DIASTOLIC BLOOD PRESSURE: 60 MMHG | BODY MASS INDEX: 29.83 KG/M2 | OXYGEN SATURATION: 100 % | WEIGHT: 196.8 LBS | HEIGHT: 68 IN

## 2025-05-05 DIAGNOSIS — R94.39 ABNORMAL STRESS ECG WITH TREADMILL: ICD-10-CM

## 2025-05-05 DIAGNOSIS — R06.02 SOB (SHORTNESS OF BREATH): ICD-10-CM

## 2025-05-05 DIAGNOSIS — I10 ESSENTIAL HYPERTENSION: ICD-10-CM

## 2025-05-05 DIAGNOSIS — E78.2 MIXED HYPERLIPIDEMIA: Primary | ICD-10-CM

## 2025-05-05 DIAGNOSIS — I25.10 CORONARY ARTERY CALCIFICATION: ICD-10-CM

## 2025-05-05 PROCEDURE — 99214 OFFICE O/P EST MOD 30 MIN: CPT | Performed by: INTERNAL MEDICINE

## 2025-05-05 NOTE — PROGRESS NOTES
PATIENTINFORMATION    Date of Office Visit: 2025  Encounter Provider: Earl Kruger MD  Place of Service: [facility] CARDIOLOGY  Patient Name: Selene Holley  : 1955    Subjective:     Encounter Date:2025      Patient ID: Selene Holley is a 70 y.o. female.    Chief Complaint   Patient presents with    Hypertension       HPI  Ms. Holley is a pleasant 70 years old lady with came to cardiology clinic for follow-up visit.  She has lost more than 40 pounds with activities and GLP-1 agonist injection.  She swims fairly regularly and shortness of breath has significantly improved.  No recent ER visits  She is going through a divorce.      ROS  All systems reviewed and negative except as noted in HPI.    Past Medical History:   Diagnosis Date    Anxiety and depression     Cataracts, bilateral     Coronary artery calcification     Diabetic peripheral neuropathy     DM (diabetes mellitus), type 2     Gastric ulcer     Hyperlipidemia     Matta's neuroma of both feet     Overactive bladder     Pancreatitis     GALLBLADDER PANCREATITIS    Peripheral edema     Sleep apnea     CPAP    Trouble swallowing     Urethral granuloma        Past Surgical History:   Procedure Laterality Date    CARDIAC CATHETERIZATION N/A 2024    Procedure: Left Heart Cath;  Surgeon: Piotr Wells MD;  Location: [facility] INVASIVE LOCATION;  Service: Cardiovascular;  Laterality: N/A;    CARDIAC CATHETERIZATION N/A 2024    Procedure: Coronary angiography;  Surgeon: Piotr Wells MD;  Location: [facility] INVASIVE LOCATION;  Service: Cardiovascular;  Laterality: N/A;    CATARACT EXTRACTION W/ INTRAOCULAR LENS  IMPLANT, BILATERAL  2023    CHOLECYSTECTOMY      COLONOSCOPY  2018    CLEAR/Q10 YEARS    CYST REMOVAL      LEFT THIGH;41 YEARS AGO    ENDOSCOPY N/A 2023    Procedure: ESOPHAGOGASTRODUODENOSCOPY with biopsy;  Surgeon: Piotr Bustillo MD;  Location: [facility]  "ENDOSCOPY;  Service: Gastroenterology;  Laterality: N/A;  PRE - abnormal ct  POST - gastritis, gastric ulcers    EYE CAPSULOTOMY WITH LASER Left     MOUTH SURGERY      periodontal    MYOMECTOMY  02/2002    TONSILLECTOMY AND ADENOIDECTOMY      TOTAL ABDOMINAL HYSTERECTOMY  2000     2 UTERINE FIBROIDS    TOTAL KNEE ARTHROPLASTY Right 2017       Social History     Socioeconomic History    Marital status: Legally    Tobacco Use    Smoking status: Former     Current packs/day: 0.00     Average packs/day: 1 pack/day for 35.0 years (35.0 ttl pk-yrs)     Types: Cigarettes     Start date: 1980     Quit date: 2015     Years since quitting: 10.3    Smokeless tobacco: Never   Vaping Use    Vaping status: Never Used   Substance and Sexual Activity    Alcohol use: Yes     Alcohol/week: 2.0 - 3.0 standard drinks of alcohol     Types: 2 - 3 Standard drinks or equivalent per week     Comment: social on weekend    Drug use: Never    Sexual activity: Defer       Family History   Problem Relation Age of Onset    Polycystic kidney disease Mother     No Known Problems Father     Breast cancer Sister         LATE 50'S    Hypertension Sister     Diabetes Sister     Hypertension Sister     Polycystic kidney disease Brother     Diabetes Brother     Polycystic kidney disease Brother     Kidney disease Brother         s/p transplant    Diabetes Brother     Hyperlipidemia Daughter     Other Daughter         FATTY LIVER     Diabetes Maternal Aunt     Malig Hyperthermia Neg Hx          Procedures       Objective:     BP 98/60 (BP Location: Right arm, Patient Position: Sitting, Cuff Size: Large Adult)   Pulse 64   Ht 172.7 cm (68\")   Wt 89.3 kg (196 lb 12.8 oz)   SpO2 100%   BMI 29.92 kg/m²  Body mass index is 29.92 kg/m².     Constitutional:       General: Not in acute distress.     Appearance: Well-developed. Not diaphoretic.   Eyes:      Pupils: Pupils are equal, round, and reactive to light.   HENT:      Head: Normocephalic and " atraumatic.   Neck:      Thyroid: No thyromegaly.   Pulmonary:      Effort: Pulmonary effort is normal. No respiratory distress.      Breath sounds: Normal breath sounds. No wheezing. No rales.   Chest:      Chest wall: Not tender to palpatation.   Cardiovascular:      Normal rate. Regular rhythm.      No gallop.    Pulses:     Intact distal pulses.   Edema:     Peripheral edema absent.   Abdominal:      General: Bowel sounds are normal. There is no distension.      Palpations: Abdomen is soft.      Tenderness: There is no guarding.   Musculoskeletal: Normal range of motion.         General: No deformity.      Cervical back: Normal range of motion and neck supple. Skin:     General: Skin is warm and dry.      Findings: No rash.   Neurological:      Mental Status: Alert and oriented to person, place, and time.      Cranial Nerves: No cranial nerve deficit.      Deep Tendon Reflexes: Reflexes are normal and symmetric.   Psychiatric:         Judgment: Judgment normal.         Review Of Data: I have reviewed pertinent recent labs, images and documents and pertinent findings included in HPI or assessment below.    Lipid Panel          9/11/2024    10:38 3/24/2025    08:42   Lipid Panel   Total Cholesterol 168  126    Triglycerides 118  102    HDL Cholesterol 55  47    VLDL Cholesterol 21  19    LDL Cholesterol  92  60      Assessment/Plan:        History of shortness of breath wheeze with abnormal stress ECG with treadmill-diffuse horizontal 1-2 mm ST segment depression noted just after first minute of exercise.  Did not completely return to baseline 4 minutes into recovery.  Appropriate blood pressure response.  She had shortness of breath but no chest discomfort.  Because of concerns of ischemic EKG changes she had coronary angiogram that only showed mild disease of LAD and RCA   In January 2024.  She was started on metoprolol with concerns of microvascular angina.  Essential hypertension  Hypercholesterolemia on  statin-LDL near goal, mildly elevated triglyceride levels.  Type II DM that is fairly controlled  Obesity: Lost more than 40 pounds on Mounjaro    BP on the lower side without significant symptoms.  Discontinue metoprolol.  She is doing fairly well otherwise.  She will continue to exercise regularly.  I will send her for treadmill test to see if she continues to have EKG changes.    Diagnosis and plan of care discussed with patient and verbalized understanding.            Your medication list            Accurate as of May 5, 2025 10:28 AM. If you have any questions, ask your nurse or doctor.                CONTINUE taking these medications        Instructions Last Dose Given Next Dose Due   aspirin 81 MG EC tablet      Take 1 tablet by mouth Daily.       atorvastatin 80 MG tablet  Commonly known as: LIPITOR      TAKE 1 TABLET BY MOUTH EVERY DAY       Denta 5000 Plus 1.1 % cream  Generic drug: Sodium Fluoride      See Admin Instructions.       diazePAM 2 MG tablet  Commonly known as: VALIUM      TAKE 1-2 TABLETS BY MOUTH DAILY AS NEEDED FOR ANXIETY.       escitalopram 20 MG tablet  Commonly known as: LEXAPRO      TAKE 1 TABLET BY MOUTH EVERY DAY       metFORMIN  MG 24 hr tablet  Commonly known as: GLUCOPHAGE-XR      TAKE 2 TABLETS BY MOUTH EVERY DAY       metoprolol succinate XL 25 MG 24 hr tablet  Commonly known as: TOPROL-XL      TAKE 1/2 TABLET BY MOUTH DAILY       Tirzepatide 15 MG/0.5ML solution auto-injector      Inject 15 mg under the skin into the appropriate area as directed 1 (One) Time Per Week.       valACYclovir 1000 MG tablet  Commonly known as: Valtrex      Take 2 tablets by mouth 2 (Two) Times a Day. FOR ONE DAY PER COLD SORE FLARE.       vitamin D3 125 MCG (5000 UT) capsule capsule      Take 1 capsule by mouth Daily.                    Earl Kruger MD  05/05/25  10:28 EDT

## 2025-05-09 ENCOUNTER — TELEPHONE (OUTPATIENT)
Dept: CARDIOLOGY | Age: 70
End: 2025-05-09
Payer: COMMERCIAL

## 2025-05-12 ENCOUNTER — HOSPITAL ENCOUNTER (OUTPATIENT)
Dept: CARDIOLOGY | Facility: HOSPITAL | Age: 70
Discharge: HOME | End: 2025-05-12
Admitting: INTERNAL MEDICINE
Payer: COMMERCIAL

## 2025-05-12 DIAGNOSIS — R06.02 SOB (SHORTNESS OF BREATH): ICD-10-CM

## 2025-05-12 PROCEDURE — 93017 CV STRESS TEST TRACING ONLY: CPT

## 2025-05-12 PROCEDURE — 93016 CV STRESS TEST SUPVJ ONLY: CPT | Performed by: INTERNAL MEDICINE

## 2025-05-12 PROCEDURE — 93018 CV STRESS TEST I&R ONLY: CPT | Performed by: INTERNAL MEDICINE

## 2025-05-13 LAB
BH CV STRESS BP STAGE 1: NORMAL
BH CV STRESS BP STAGE 2: NORMAL
BH CV STRESS DURATION MIN STAGE 1: 3
BH CV STRESS DURATION MIN STAGE 2: 3
BH CV STRESS DURATION SEC STAGE 1: 0
BH CV STRESS DURATION SEC STAGE 2: 0
BH CV STRESS GRADE STAGE 1: 10
BH CV STRESS GRADE STAGE 2: 12
BH CV STRESS HR STAGE 1: 115
BH CV STRESS HR STAGE 2: 154
BH CV STRESS METS STAGE 1: 5
BH CV STRESS METS STAGE 2: 7.5
BH CV STRESS PROTOCOL 1: NORMAL
BH CV STRESS RECOVERY BP: NORMAL MMHG
BH CV STRESS RECOVERY HR: 90 BPM
BH CV STRESS SPEED STAGE 1: 1.7
BH CV STRESS SPEED STAGE 2: 2.5
BH CV STRESS STAGE 1: 1
BH CV STRESS STAGE 2: 2
MAXIMAL PREDICTED HEART RATE: 151 BPM
PERCENT MAX PREDICTED HR: 101.99 %
STRESS BASELINE BP: NORMAL MMHG
STRESS BASELINE HR: 74 BPM
STRESS PERCENT HR: 120 %
STRESS POST ESTIMATED WORKLOAD: 7.5 METS
STRESS POST EXERCISE DUR MIN: 6 MIN
STRESS POST EXERCISE DUR SEC: 0 SEC
STRESS POST PEAK BP: NORMAL MMHG
STRESS POST PEAK HR: 154 BPM
STRESS TARGET HR: 128 BPM

## 2025-05-19 RX ORDER — METOPROLOL SUCCINATE 25 MG/1
12.5 TABLET, EXTENDED RELEASE ORAL DAILY
Qty: 45 TABLET | Refills: 0 | OUTPATIENT
Start: 2025-05-19

## 2025-05-19 NOTE — TELEPHONE ENCOUNTER
NOV:05/08/2026  LOV:05/05/2025    Per LOV        Called and s/w pt. She stopped the medication.

## 2025-06-08 DIAGNOSIS — B00.1 RECURRENT COLD SORES: ICD-10-CM

## 2025-06-09 RX ORDER — VALACYCLOVIR HYDROCHLORIDE 1 G/1
2000 TABLET, FILM COATED ORAL 2 TIMES DAILY
Qty: 10 TABLET | Refills: 0 | Status: SHIPPED | OUTPATIENT
Start: 2025-06-09

## 2025-06-18 DIAGNOSIS — E11.40 TYPE 2 DIABETES MELLITUS WITH DIABETIC NEUROPATHY, WITHOUT LONG-TERM CURRENT USE OF INSULIN: ICD-10-CM

## 2025-06-19 ENCOUNTER — TELEPHONE (OUTPATIENT)
Dept: INTERNAL MEDICINE | Facility: CLINIC | Age: 70
End: 2025-06-19
Payer: COMMERCIAL

## 2025-06-19 NOTE — TELEPHONE ENCOUNTER
Pt calling in regards to medication, Tirzepatide 15 MG/0.5ML solution auto-injector - Pt states that recently, after injecting herself with medication, a red and itchy spot will appear right where injection was made- Pt states she has switched legs when injecting and an itchy red spot will still appear- Pt would like to be advised on what she should do please- Pt is unsure if she should  her next refill or not.      Best call back number  [phone]

## 2025-06-23 NOTE — TELEPHONE ENCOUNTER
"Injection site reactions are common side effects. Likely does not represent an \"allergy\" but just a side effect. Did she have this side effect with the lower dosages? She can choose to stay on this dosage or reduce to a lower dosage, just let me know.  "

## 2025-08-05 ENCOUNTER — OFFICE VISIT (OUTPATIENT)
Dept: INTERNAL MEDICINE | Facility: CLINIC | Age: 70
End: 2025-08-05
Payer: COMMERCIAL

## 2025-08-05 VITALS
OXYGEN SATURATION: 99 % | HEART RATE: 71 BPM | TEMPERATURE: 97.1 F | SYSTOLIC BLOOD PRESSURE: 100 MMHG | HEIGHT: 68 IN | DIASTOLIC BLOOD PRESSURE: 60 MMHG | WEIGHT: 189.8 LBS | BODY MASS INDEX: 28.76 KG/M2

## 2025-08-05 DIAGNOSIS — R68.89 COLD INTOLERANCE: ICD-10-CM

## 2025-08-05 DIAGNOSIS — E11.40 TYPE 2 DIABETES MELLITUS WITH DIABETIC NEUROPATHY, WITHOUT LONG-TERM CURRENT USE OF INSULIN: Primary | ICD-10-CM

## 2025-08-05 PROCEDURE — 99214 OFFICE O/P EST MOD 30 MIN: CPT | Performed by: STUDENT IN AN ORGANIZED HEALTH CARE EDUCATION/TRAINING PROGRAM

## 2025-08-08 ENCOUNTER — TELEPHONE (OUTPATIENT)
Dept: INTERNAL MEDICINE | Facility: CLINIC | Age: 70
End: 2025-08-08

## 2025-08-18 LAB
APCA+IF AB INTERPRETATION: ABNORMAL
BUN SERPL-MCNC: 8 MG/DL (ref 8–23)
BUN/CREAT SERPL: 10.8 (ref 7–25)
CALCIUM SERPL-MCNC: 8.9 MG/DL (ref 8.6–10.5)
CHLORIDE SERPL-SCNC: 106 MMOL/L (ref 98–107)
CO2 SERPL-SCNC: 21.9 MMOL/L (ref 22–29)
CREAT SERPL-MCNC: 0.74 MG/DL (ref 0.57–1)
EGFRCR SERPLBLD CKD-EPI 2021: 87.7 ML/MIN/1.73
GLUCOSE SERPL-MCNC: 83 MG/DL (ref 65–99)
HBA1C MFR BLD: 5 % (ref 4.8–5.6)
IF BLOCK AB SER-ACNC: 0.9 AU/ML (ref 0–1.1)
PCA AB SER-ACNC: 0.8 UNITS (ref 0–20)
POTASSIUM SERPL-SCNC: 3.9 MMOL/L (ref 3.5–5.2)
SODIUM SERPL-SCNC: 141 MMOL/L (ref 136–145)
TSH SERPL DL<=0.005 MIU/L-ACNC: 2.69 UIU/ML (ref 0.27–4.2)
VIT B12 SERPL-MCNC: 160 PG/ML (ref 232–1245)

## 2025-08-19 RX ORDER — ESCITALOPRAM OXALATE 20 MG/1
20 TABLET ORAL DAILY
Qty: 90 TABLET | Refills: 1 | Status: SHIPPED | OUTPATIENT
Start: 2025-08-19

## (undated) DEVICE — BND COMPR/HEMO RADL VASCBAND REG 24CM 1P/U

## (undated) DEVICE — GW EMR FIX EXCHG J STD .035 3MM 260CM

## (undated) DEVICE — MSK ENDO PORT O2 POM ELITE CURAPLEX A/

## (undated) DEVICE — KT MANIFLD CARDIAC

## (undated) DEVICE — LN SMPL CO2 SHTRM SD STREAM W/M LUER

## (undated) DEVICE — TUBING, SUCTION, 1/4" X 10', STRAIGHT: Brand: MEDLINE

## (undated) DEVICE — KT ORCA ORCAPOD DISP STRL

## (undated) DEVICE — SENSR O2 OXIMAX FNGR A/ 18IN NONSTR

## (undated) DEVICE — CANN O2 ETCO2 FITS ALL CONN CO2 SMPL A/ 7IN DISP LF

## (undated) DEVICE — PK CATH CARD 40

## (undated) DEVICE — INTRO GLIDESHEATH SLENDER SS 21G .021 6F 10CM 45CM

## (undated) DEVICE — CATH CORNRY OPTITORQUE 1SH TR4 5F 110

## (undated) DEVICE — FRCP BX RADJAW4 NDL 2.8 240CM LG OG BX40

## (undated) DEVICE — BLCK/BITE BLOX W/DENTL/RIM W/STRAP 54F

## (undated) DEVICE — ADAPT CLN BIOGUARD AIR/H2O DISP